# Patient Record
Sex: MALE | Race: BLACK OR AFRICAN AMERICAN | NOT HISPANIC OR LATINO | Employment: UNEMPLOYED | URBAN - METROPOLITAN AREA
[De-identification: names, ages, dates, MRNs, and addresses within clinical notes are randomized per-mention and may not be internally consistent; named-entity substitution may affect disease eponyms.]

---

## 2023-03-23 LAB
ALANINE AMINOTRANSFERASE (SGPT) (U/L) IN SER/PLAS: 123 U/L (ref 10–52)
ALBUMIN (G/DL) IN SER/PLAS: 4 G/DL (ref 3.4–5)
ALKALINE PHOSPHATASE (U/L) IN SER/PLAS: 374 U/L (ref 33–120)
ASPARTATE AMINOTRANSFERASE (SGOT) (U/L) IN SER/PLAS: 68 U/L (ref 9–39)
BILIRUBIN DIRECT (MG/DL) IN SER/PLAS: 0.2 MG/DL (ref 0–0.3)
BILIRUBIN TOTAL (MG/DL) IN SER/PLAS: 0.6 MG/DL (ref 0–1.2)
C REACTIVE PROTEIN (MG/L) IN SER/PLAS: 0.91 MG/DL
ERYTHROCYTE DISTRIBUTION WIDTH (RATIO) BY AUTOMATED COUNT: 14.3 % (ref 11.5–14.5)
ERYTHROCYTE MEAN CORPUSCULAR HEMOGLOBIN CONCENTRATION (G/DL) BY AUTOMATED: 32.7 G/DL (ref 32–36)
ERYTHROCYTE MEAN CORPUSCULAR VOLUME (FL) BY AUTOMATED COUNT: 94 FL (ref 80–100)
ERYTHROCYTES (10*6/UL) IN BLOOD BY AUTOMATED COUNT: 4.77 X10E12/L (ref 4.5–5.9)
HEMATOCRIT (%) IN BLOOD BY AUTOMATED COUNT: 45 % (ref 41–52)
HEMOGLOBIN (G/DL) IN BLOOD: 14.7 G/DL (ref 13.5–17.5)
LEUKOCYTES (10*3/UL) IN BLOOD BY AUTOMATED COUNT: 8.8 X10E9/L (ref 4.4–11.3)
NRBC (PER 100 WBCS) BY AUTOMATED COUNT: 0 /100 WBC (ref 0–0)
PLATELETS (10*3/UL) IN BLOOD AUTOMATED COUNT: 345 X10E9/L (ref 150–450)
PROTEIN TOTAL: 7.8 G/DL (ref 6.4–8.2)

## 2023-06-16 ENCOUNTER — HOSPITAL ENCOUNTER (OUTPATIENT)
Dept: DATA CONVERSION | Facility: HOSPITAL | Age: 53
End: 2023-06-16
Attending: INTERNAL MEDICINE
Payer: COMMERCIAL

## 2023-06-16 DIAGNOSIS — J45.909 UNSPECIFIED ASTHMA, UNCOMPLICATED (HHS-HCC): ICD-10-CM

## 2023-06-16 DIAGNOSIS — G47.30 SLEEP APNEA, UNSPECIFIED: ICD-10-CM

## 2023-06-16 DIAGNOSIS — Z79.69 LONG TERM (CURRENT) USE OF OTHER IMMUNOMODULATORS AND IMMUNOSUPPRESSANTS: ICD-10-CM

## 2023-06-16 DIAGNOSIS — K50.10 CROHN'S DISEASE OF LARGE INTESTINE WITHOUT COMPLICATIONS (MULTI): ICD-10-CM

## 2023-06-16 DIAGNOSIS — K50.118 CROHN'S DISEASE OF LARGE INTESTINE WITH OTHER COMPLICATION (MULTI): ICD-10-CM

## 2023-06-16 DIAGNOSIS — F17.290 NICOTINE DEPENDENCE, OTHER TOBACCO PRODUCT, UNCOMPLICATED: ICD-10-CM

## 2023-06-16 DIAGNOSIS — K50.113 CROHN'S DISEASE OF LARGE INTESTINE WITH FISTULA (MULTI): ICD-10-CM

## 2023-06-23 LAB
COMPLETE PATHOLOGY REPORT: NORMAL
CONVERTED CLINICAL DIAGNOSIS-HISTORY: NORMAL
CONVERTED FINAL DIAGNOSIS: NORMAL
CONVERTED FINAL REPORT PDF LINK TO COPY AND PASTE: NORMAL
CONVERTED GROSS DESCRIPTION: NORMAL

## 2023-09-21 PROBLEM — H25.12 AGE-RELATED NUCLEAR CATARACT OF LEFT EYE: Status: ACTIVE | Noted: 2023-09-21

## 2023-09-21 PROBLEM — F32.A DEPRESSION: Status: ACTIVE | Noted: 2023-09-21

## 2023-09-21 PROBLEM — K60.3 ANAL FISTULA: Status: ACTIVE | Noted: 2023-09-21

## 2023-09-21 PROBLEM — K61.1 ABSCESS, RECTUM: Status: ACTIVE | Noted: 2023-09-21

## 2023-09-21 PROBLEM — S34.22XA: Status: ACTIVE | Noted: 2023-09-21

## 2023-09-21 PROBLEM — H52.209 ASTIGMATISM: Status: ACTIVE | Noted: 2023-09-21

## 2023-09-21 PROBLEM — J44.9 COPD (CHRONIC OBSTRUCTIVE PULMONARY DISEASE) (MULTI): Status: ACTIVE | Noted: 2023-09-21

## 2023-09-21 PROBLEM — S22.32XD CLOSED FRACTURE OF ONE RIB OF LEFT SIDE WITH ROUTINE HEALING: Status: ACTIVE | Noted: 2023-03-13

## 2023-09-21 PROBLEM — H40.9 GLAUCOMA: Status: ACTIVE | Noted: 2023-09-21

## 2023-09-21 PROBLEM — Z78.9 DISCOMFORT: Status: ACTIVE | Noted: 2023-09-21

## 2023-09-21 PROBLEM — H25.11 AGE-RELATED NUCLEAR CATARACT OF RIGHT EYE: Status: ACTIVE | Noted: 2023-09-21

## 2023-09-21 PROBLEM — L73.9 FOLLICULITIS: Status: ACTIVE | Noted: 2023-09-21

## 2023-09-21 PROBLEM — K50.113: Status: ACTIVE | Noted: 2023-09-21

## 2023-09-21 PROBLEM — K52.9 ENTERITIS: Status: ACTIVE | Noted: 2023-09-21

## 2023-09-21 PROBLEM — L85.3 DRY SKIN: Status: ACTIVE | Noted: 2023-09-21

## 2023-09-21 PROBLEM — H40.003 GLAUCOMA SUSPECT OF BOTH EYES: Status: ACTIVE | Noted: 2023-09-21

## 2023-09-21 PROBLEM — K50.90 CROHN DISEASE (MULTI): Status: ACTIVE | Noted: 2023-09-21

## 2023-09-21 PROBLEM — H52.10 MYOPIA: Status: ACTIVE | Noted: 2023-09-21

## 2023-09-21 PROBLEM — G47.33 OSA (OBSTRUCTIVE SLEEP APNEA): Status: ACTIVE | Noted: 2023-09-21

## 2023-09-21 PROBLEM — R79.89 ABNORMAL LIVER FUNCTION TEST: Status: ACTIVE | Noted: 2023-09-21

## 2023-09-21 PROBLEM — J31.0 RHINITIS, CHRONIC: Status: ACTIVE | Noted: 2021-12-30

## 2023-09-21 PROBLEM — J93.9 PNEUMOTHORAX: Status: ACTIVE | Noted: 2023-03-12

## 2023-09-21 PROBLEM — M16.9 HIP OSTEOARTHRITIS: Status: ACTIVE | Noted: 2023-09-21

## 2023-09-21 PROBLEM — G45.9 TIA (TRANSIENT ISCHEMIC ATTACK): Status: ACTIVE | Noted: 2023-09-21

## 2023-09-21 PROBLEM — H18.519 FUCHS' CORNEAL DYSTROPHY: Status: ACTIVE | Noted: 2023-09-21

## 2023-09-21 PROBLEM — M87.00 AVN (AVASCULAR NECROSIS OF BONE) (MULTI): Status: ACTIVE | Noted: 2023-09-21

## 2023-09-21 PROBLEM — S43.102A SEPARATION OF LEFT ACROMIOCLAVICULAR JOINT: Status: ACTIVE | Noted: 2023-09-14

## 2023-09-21 PROBLEM — L40.9 PSORIASIS OF SCALP: Status: ACTIVE | Noted: 2023-09-21

## 2023-09-21 PROBLEM — K62.89 RECTAL PAIN: Status: ACTIVE | Noted: 2023-09-21

## 2023-09-21 PROBLEM — K61.1 PERIRECTAL ABSCESS: Status: ACTIVE | Noted: 2023-09-21

## 2023-09-21 PROBLEM — H20.10 CHRONIC ANTERIOR UVEITIS: Status: ACTIVE | Noted: 2023-09-21

## 2023-09-21 PROBLEM — S42.035D CLOSED NONDISPLACED FRACTURE OF ACROMIAL END OF LEFT CLAVICLE WITH ROUTINE HEALING: Status: ACTIVE | Noted: 2023-03-13

## 2023-09-21 PROBLEM — L40.0 PSORIASIS VULGARIS: Status: ACTIVE | Noted: 2019-11-19

## 2023-09-21 PROBLEM — E53.8 FOLIC ACID DEFICIENCY: Status: ACTIVE | Noted: 2023-09-21

## 2023-09-21 PROBLEM — R10.30 ABDOMINAL PAIN, LOWER: Status: ACTIVE | Noted: 2023-09-21

## 2023-09-21 PROBLEM — K60.30 ANAL FISTULA: Status: ACTIVE | Noted: 2023-09-21

## 2023-09-21 PROBLEM — H10.9 CONJUNCTIVITIS, LEFT EYE: Status: ACTIVE | Noted: 2023-09-21

## 2023-09-21 PROBLEM — R21 RASH AND OTHER NONSPECIFIC SKIN ERUPTION: Status: ACTIVE | Noted: 2019-11-19

## 2023-09-21 PROBLEM — R25.2: Status: ACTIVE | Noted: 2023-09-21

## 2023-09-21 PROBLEM — N52.9 ERECTILE DYSFUNCTION: Status: ACTIVE | Noted: 2023-09-21

## 2023-09-21 PROBLEM — T38.0X5A STEROID SIDE EFFECTS: Status: ACTIVE | Noted: 2023-09-21

## 2023-09-21 PROBLEM — R35.0 FREQUENT URINATION: Status: ACTIVE | Noted: 2023-09-21

## 2023-09-21 PROBLEM — F17.200 NICOTINE USE DISORDER: Status: ACTIVE | Noted: 2023-03-13

## 2023-09-21 PROBLEM — K04.7 TOOTH ABSCESS: Status: ACTIVE | Noted: 2023-09-21

## 2023-09-21 RX ORDER — ALBUTEROL SULFATE 90 UG/1
2 AEROSOL, METERED RESPIRATORY (INHALATION) EVERY 4 HOURS PRN
COMMUNITY
Start: 2023-01-26

## 2023-09-21 RX ORDER — CLOBETASOL PROPIONATE 0.05 G/100ML
SHAMPOO TOPICAL
COMMUNITY

## 2023-09-21 RX ORDER — FOLIC ACID 1 MG/1
1 TABLET ORAL DAILY
COMMUNITY
Start: 2019-03-06 | End: 2023-10-12 | Stop reason: WASHOUT

## 2023-09-21 RX ORDER — ALBUTEROL SULFATE 0.83 MG/ML
SOLUTION RESPIRATORY (INHALATION)
COMMUNITY

## 2023-09-21 RX ORDER — BETAMETHASONE DIPROPIONATE 0.5 MG/G
OINTMENT TOPICAL
COMMUNITY
Start: 2019-09-04 | End: 2023-10-12 | Stop reason: WASHOUT

## 2023-09-21 RX ORDER — OXYCODONE HYDROCHLORIDE 5 MG/1
5 TABLET ORAL EVERY 6 HOURS PRN
COMMUNITY
Start: 2023-03-14 | End: 2023-10-12 | Stop reason: WASHOUT

## 2023-09-21 RX ORDER — SODIUM PICOSULFATE, MAGNESIUM OXIDE, AND ANHYDROUS CITRIC ACID 10; 3.5; 12 MG/160ML; G/160ML; G/160ML
LIQUID ORAL
COMMUNITY
Start: 2023-03-03 | End: 2023-10-12 | Stop reason: WASHOUT

## 2023-09-21 RX ORDER — DOCUSATE SODIUM 100 MG/1
100 CAPSULE, LIQUID FILLED ORAL 2 TIMES DAILY
COMMUNITY
Start: 2023-03-13 | End: 2023-10-12 | Stop reason: WASHOUT

## 2023-09-21 RX ORDER — TADALAFIL 5 MG/1
TABLET ORAL
COMMUNITY
Start: 2021-03-11 | End: 2024-04-22 | Stop reason: WASHOUT

## 2023-09-21 RX ORDER — METHYLPREDNISOLONE 4 MG/1
TABLET ORAL
COMMUNITY
Start: 2023-01-25 | End: 2023-10-12 | Stop reason: WASHOUT

## 2023-09-21 RX ORDER — SILDENAFIL 25 MG/1
TABLET, FILM COATED ORAL
COMMUNITY
Start: 2022-11-30

## 2023-09-21 RX ORDER — AMLODIPINE BESYLATE 5 MG/1
5 TABLET ORAL DAILY
COMMUNITY
Start: 2022-10-10 | End: 2023-10-12 | Stop reason: WASHOUT

## 2023-09-21 RX ORDER — MONTELUKAST SODIUM 10 MG/1
10 TABLET ORAL NIGHTLY
COMMUNITY
Start: 2023-04-29 | End: 2023-10-12 | Stop reason: WASHOUT

## 2023-09-21 RX ORDER — GABAPENTIN 300 MG/1
300 CAPSULE ORAL 3 TIMES DAILY
COMMUNITY
Start: 2021-08-17 | End: 2024-04-04 | Stop reason: ALTCHOICE

## 2023-09-21 RX ORDER — ACETAMINOPHEN 500 MG/1
CAPSULE, LIQUID FILLED ORAL 4 TIMES DAILY PRN
COMMUNITY
End: 2023-10-12 | Stop reason: WASHOUT

## 2023-09-21 RX ORDER — DORZOLAMIDE HYDROCHLORIDE AND TIMOLOL MALEATE 20; 5 MG/ML; MG/ML
SOLUTION/ DROPS OPHTHALMIC
COMMUNITY

## 2023-09-21 RX ORDER — FLUOCINONIDE 0.5 MG/G
CREAM TOPICAL
COMMUNITY
Start: 2019-08-30

## 2023-09-21 RX ORDER — FLUTICASONE PROPIONATE 50 MCG
SPRAY, SUSPENSION (ML) NASAL
COMMUNITY
Start: 2013-10-26

## 2023-09-21 RX ORDER — METHOCARBAMOL 500 MG/1
500 TABLET, FILM COATED ORAL 3 TIMES DAILY
COMMUNITY
Start: 2023-04-25

## 2023-09-21 RX ORDER — TIOTROPIUM BROMIDE INHALATION SPRAY 3.12 UG/1
SPRAY, METERED RESPIRATORY (INHALATION)
COMMUNITY
End: 2023-10-12 | Stop reason: WASHOUT

## 2023-09-21 RX ORDER — MOMETASONE FUROATE AND FORMOTEROL FUMARATE DIHYDRATE 50; 5 UG/1; UG/1
AEROSOL RESPIRATORY (INHALATION)
COMMUNITY
Start: 2023-05-30

## 2023-09-21 RX ORDER — ASPIRIN 325 MG
TABLET, DELAYED RELEASE (ENTERIC COATED) ORAL
COMMUNITY
End: 2023-10-12 | Stop reason: WASHOUT

## 2023-09-21 RX ORDER — USTEKINUMAB 90 MG/ML
INJECTION, SOLUTION SUBCUTANEOUS
COMMUNITY
Start: 2019-09-06 | End: 2023-10-18 | Stop reason: SDUPTHER

## 2023-09-21 RX ORDER — LORATADINE 10 MG/1
10 TABLET ORAL DAILY PRN
COMMUNITY
Start: 2023-04-29

## 2023-09-21 RX ORDER — LIDOCAINE 50 MG/G
OINTMENT TOPICAL
COMMUNITY
Start: 2021-01-28

## 2023-10-06 ENCOUNTER — LAB (OUTPATIENT)
Dept: LAB | Facility: LAB | Age: 53
End: 2023-10-06
Payer: COMMERCIAL

## 2023-10-06 DIAGNOSIS — R74.8 ABNORMAL LEVELS OF OTHER SERUM ENZYMES: ICD-10-CM

## 2023-10-06 DIAGNOSIS — K50.90 CROHN'S DISEASE, UNSPECIFIED, WITHOUT COMPLICATIONS (MULTI): Primary | ICD-10-CM

## 2023-10-06 DIAGNOSIS — K50.90 CROHN'S DISEASE, UNSPECIFIED, WITHOUT COMPLICATIONS (MULTI): ICD-10-CM

## 2023-10-06 LAB
A1AT SERPL NEPH-MCNC: 178 MG/DL (ref 84–218)
ALP SERPL-CCNC: 305 U/L (ref 33–120)
CERULOPLASMIN SERPL-MCNC: 39.8 MG/DL (ref 20–60)
IGA SERPL-MCNC: 477 MG/DL (ref 70–400)
IGG SERPL-MCNC: 1820 MG/DL (ref 700–1600)
IGM SERPL-MCNC: 134 MG/DL (ref 40–230)

## 2023-10-06 PROCEDURE — 82103 ALPHA-1-ANTITRYPSIN TOTAL: CPT

## 2023-10-06 PROCEDURE — 36415 COLL VENOUS BLD VENIPUNCTURE: CPT

## 2023-10-06 PROCEDURE — 86376 MICROSOMAL ANTIBODY EACH: CPT

## 2023-10-06 PROCEDURE — 82784 ASSAY IGA/IGD/IGG/IGM EACH: CPT

## 2023-10-06 PROCEDURE — 86381 MITOCHONDRIAL ANTIBODY EACH: CPT

## 2023-10-06 PROCEDURE — 84075 ASSAY ALKALINE PHOSPHATASE: CPT

## 2023-10-06 PROCEDURE — 83516 IMMUNOASSAY NONANTIBODY: CPT

## 2023-10-06 PROCEDURE — 82390 ASSAY OF CERULOPLASMIN: CPT

## 2023-10-08 LAB — LKM AB TITR SER IF: NORMAL {TITER}

## 2023-10-09 LAB — MITOCHONDRIA AB SER QL IF: NEGATIVE

## 2023-10-10 LAB
MYELOPEROXIDASE AB SER-ACNC: 0 AU/ML (ref 0–19)
PROTEINASE3 AB SER-ACNC: 1 AU/ML (ref 0–19)
SOLUBLE LIVER IGG SER IA-ACNC: 2 U (ref 0–24.9)

## 2023-10-12 ENCOUNTER — OFFICE VISIT (OUTPATIENT)
Dept: GASTROENTEROLOGY | Facility: CLINIC | Age: 53
End: 2023-10-12
Payer: COMMERCIAL

## 2023-10-12 VITALS
BODY MASS INDEX: 28.71 KG/M2 | WEIGHT: 168.2 LBS | HEART RATE: 50 BPM | HEIGHT: 64 IN | TEMPERATURE: 98.4 F | DIASTOLIC BLOOD PRESSURE: 75 MMHG | SYSTOLIC BLOOD PRESSURE: 150 MMHG

## 2023-10-12 DIAGNOSIS — K50.113 CROHN'S DISEASE OF LARGE INTESTINE WITH FISTULA (MULTI): Primary | ICD-10-CM

## 2023-10-12 PROCEDURE — 99213 OFFICE O/P EST LOW 20 MIN: CPT | Performed by: INTERNAL MEDICINE

## 2023-10-12 ASSESSMENT — PAIN SCALES - GENERAL: PAINLEVEL: 0-NO PAIN

## 2023-10-12 NOTE — PATIENT INSTRUCTIONS
Thank you for coming in for follow-up today.  As we discussed, your Crohn's disease is doing quite well on therapy.  As reviewed:    1) continue Stelara every 4 weeks  2) follow-up in the office in 6 months  3) follow-up with Dr. Hendrickson in hepatology for ongoing evaluation of suspected liver abnormalities  4) call the office with any worsening diarrhea, fistula problems, or other signs of increasing Crohn's disease activity.

## 2023-10-12 NOTE — PROGRESS NOTES
"F/U for 53-year-old man with long-standing Crohn's colitis and perianal Crohn's disease. Initially diagnosed as UC in 1990's.  Later developed perianal disease -->Crohn's disease.  Treated with 5-ASA and steroids on and off for years.    Started Humira in 3/2019; did OK but developed a treatment-related palmar-plantar psoriasis. 9/2019 changed to Stelara.    (+) HS with drainage intermittently in underarms    3/2020 FCP 34 (normal) and Stelara level 2.7 on every 8 week dosing; changed Q6 week dosing 6/2020.    12/2020 f/u Anser UST  on Stelara every 6 weeks = 4.8 with no antibodies.   FCP 52  CRP 1.42    6/2021 Stellara frequency from Q6 to Q4 for worsening/persistent perianal disease; since dose increased, has overall been doing well.    (+) mild persistent LFT abnormalities; USG of liver with Doppler unrevealing.  ?correlate with change to every 4 week Stelara? Saw Dr. Hendrickson.  MRI w/ elastography suggests cirrhosis w/o portal HTN.    6/2023 colonoscopy OK; path (-)    In follow-up doing OK on every 4 week Stelara.  Feels overall well.  Moving bowels 3-4 daily.  No setons and no fistula problems.  In September he had one episode of BRBPR with a small clot.  Around that time, he was one week late for Stelara due to insurance issues.  Stools were softer and runnier at this time.      No oral ulcers.  No joint inflammation, rash or eye inflammation.    Complete review of systems otherwise negative per complaint     Meds reviewed    Vital signs reviewed  /75   Pulse 50   Temp 36.9 °C (98.4 °F)   Ht 1.626 m (5' 4\")   Wt 76.3 kg (168 lb 3.2 oz)   BMI 28.87 kg/m²   Patient alert and oriented in no acute distress  Anicteric  No cervical adenopathy  Cardiac exam regular rate and rhythm S1-S2 without murmurs gallops or rubs  Lungs clear to auscultation bilaterally  Abdomen soft and nontender without organomegaly or mass.  No rebound or guarding.  Bowel sounds present  Extremities without edema; wound left lower " leg 2.5 cm, healing (he fell)  Neurologically grossly intact    A/P Crohn's colitis with perianal fistula lysing disease.  Overall, he is doing quite well on every 4-week Stelara.  We will continue therapy without change.  His last colonoscopy showed no active disease in summer 2023.  Recent labs have been stable.    He does appear to have significant liver disease and will be following with Dr. Hendrickson for this.  Liver biopsy is pending per patient.    We will continue current therapy and follow-up in the office in 6 months.  Will call with any change in symptoms.

## 2023-10-17 ENCOUNTER — SPECIALTY PHARMACY (OUTPATIENT)
Dept: PHARMACY | Facility: CLINIC | Age: 53
End: 2023-10-17

## 2023-10-17 DIAGNOSIS — K50.113 CROHN'S DISEASE OF LARGE INTESTINE WITH FISTULA (MULTI): Primary | ICD-10-CM

## 2023-10-17 RX ORDER — USTEKINUMAB 90 MG/ML
INJECTION, SOLUTION SUBCUTANEOUS
Qty: 1 ML | Refills: 5 | Status: SHIPPED | OUTPATIENT
Start: 2023-10-17 | End: 2024-03-26 | Stop reason: SDUPTHER

## 2023-10-18 ENCOUNTER — PHARMACY VISIT (OUTPATIENT)
Dept: PHARMACY | Facility: CLINIC | Age: 53
End: 2023-10-18
Payer: MEDICAID

## 2023-10-18 ENCOUNTER — TELEPHONE (OUTPATIENT)
Dept: GASTROENTEROLOGY | Facility: HOSPITAL | Age: 53
End: 2023-10-18
Payer: COMMERCIAL

## 2023-10-18 DIAGNOSIS — K50.113 CROHN'S DISEASE OF LARGE INTESTINE WITH FISTULA (MULTI): Primary | ICD-10-CM

## 2023-10-18 PROCEDURE — RXMED WILLOW AMBULATORY MEDICATION CHARGE

## 2023-10-19 RX ORDER — USTEKINUMAB 90 MG/ML
90 INJECTION, SOLUTION SUBCUTANEOUS ONCE
Qty: 1 ML | Refills: 11 | Status: SHIPPED | OUTPATIENT
Start: 2023-10-19 | End: 2024-05-22 | Stop reason: WASHOUT

## 2023-10-20 ENCOUNTER — SPECIALTY PHARMACY (OUTPATIENT)
Dept: PHARMACY | Facility: CLINIC | Age: 53
End: 2023-10-20

## 2023-10-23 ENCOUNTER — SPECIALTY PHARMACY (OUTPATIENT)
Dept: PHARMACY | Facility: CLINIC | Age: 53
End: 2023-10-23

## 2023-11-14 ENCOUNTER — PHARMACY VISIT (OUTPATIENT)
Dept: PHARMACY | Facility: CLINIC | Age: 53
End: 2023-11-14
Payer: MEDICAID

## 2023-11-14 PROCEDURE — RXMED WILLOW AMBULATORY MEDICATION CHARGE

## 2023-11-17 ENCOUNTER — SPECIALTY PHARMACY (OUTPATIENT)
Dept: PHARMACY | Facility: CLINIC | Age: 53
End: 2023-11-17

## 2023-12-11 PROCEDURE — RXMED WILLOW AMBULATORY MEDICATION CHARGE

## 2023-12-14 ENCOUNTER — PHARMACY VISIT (OUTPATIENT)
Dept: PHARMACY | Facility: CLINIC | Age: 53
End: 2023-12-14
Payer: MEDICAID

## 2024-01-09 ENCOUNTER — PHARMACY VISIT (OUTPATIENT)
Dept: PHARMACY | Facility: CLINIC | Age: 54
End: 2024-01-09
Payer: MEDICAID

## 2024-01-09 PROCEDURE — RXMED WILLOW AMBULATORY MEDICATION CHARGE

## 2024-01-10 ENCOUNTER — SPECIALTY PHARMACY (OUTPATIENT)
Dept: PHARMACY | Facility: CLINIC | Age: 54
End: 2024-01-10

## 2024-02-02 ENCOUNTER — PHARMACY VISIT (OUTPATIENT)
Dept: PHARMACY | Facility: CLINIC | Age: 54
End: 2024-02-02
Payer: MEDICAID

## 2024-02-02 PROCEDURE — RXMED WILLOW AMBULATORY MEDICATION CHARGE

## 2024-02-15 ENCOUNTER — OFFICE VISIT (OUTPATIENT)
Dept: GASTROENTEROLOGY | Facility: CLINIC | Age: 54
End: 2024-02-15
Payer: COMMERCIAL

## 2024-02-15 VITALS
SYSTOLIC BLOOD PRESSURE: 134 MMHG | WEIGHT: 171.2 LBS | TEMPERATURE: 98.6 F | BODY MASS INDEX: 29.23 KG/M2 | HEIGHT: 64 IN | HEART RATE: 61 BPM | DIASTOLIC BLOOD PRESSURE: 73 MMHG

## 2024-02-15 DIAGNOSIS — K50.113 CROHN'S DISEASE OF LARGE INTESTINE WITH FISTULA (MULTI): ICD-10-CM

## 2024-02-15 DIAGNOSIS — K83.1 CHOLESTATIC LIVER DISEASE (CMS-HCC): ICD-10-CM

## 2024-02-15 DIAGNOSIS — K74.00 LIVER FIBROSIS: ICD-10-CM

## 2024-02-15 DIAGNOSIS — R74.8 ELEVATED ALKALINE PHOSPHATASE LEVEL: Primary | ICD-10-CM

## 2024-02-15 PROCEDURE — 99214 OFFICE O/P EST MOD 30 MIN: CPT | Performed by: INTERNAL MEDICINE

## 2024-02-15 ASSESSMENT — PAIN SCALES - GENERAL: PAINLEVEL: 8

## 2024-02-15 NOTE — PATIENT INSTRUCTIONS
Welcome to Dr. [facility].  Dr. Hendrickson sees patients at the following sites:  [place] [address] [facility] at [facility] [address], [address] at [facility] at [facility], [facility] [address]    Dr. Hendrickson's hepatology care coordinator, Viridiana REECE, can be reached at [phone].  Dr. Hendrickson's , Jane Amaro, can be reached at [phone].     Get blood work a week prior to your liver biopsy.    Get a Liver Biopsy at [facility]. Call [phone] to schedule.    See me back in clinic a month after your biopsy.   Schedule your appointment on your way out today.

## 2024-02-15 NOTE — H&P (VIEW-ONLY)
Subjective     Follow up visit - elevated alk phos, chronic cholestatic liver disease.    History of Present Illness:   Arturo Craft is a 54 y.o. male who presents to GI clinic for follow up of abnormal liver enzymes.    Here to discuss prior testing and the role of pursuing a liver biopsy    Has a long-standing Crohn's colitis and perianal Crohn's disease.   He was actually, Initially diagnosed as having ulcerative colitis. However, he later developed perianal disease and his diagnosis was changed to Crohn's disease.      He was treated with 5-ASA and steroids on and off for years. He started on Humira in 3/2019; tolerated Humira well. However he developed a treatment-related palmar-plantar psoriasis and Humira was discontinued. Sept. 2019 he was started on Stelara.     Timeline of some IBD related complications and medication changes:  -11/2019 had new anterior perianal abscess; had EUA and abscess drained, but no new fistula.   -(+) HS with drainage intermittently in underarms  - March to June 2020 Stelera q8 week dosing changed to q6 week dosing..  - perianal pain and new fistula site.   - 12/2020 continued drainage   - 1/2021 colonoscopy showed quiescent pancolonic disease with small pseudopolyps.   - 6/2021 Stellara frequency was increased to Q6 to Q4 for worsening/persistent perianal disease ; since dose increased, has overall been doing well.     Alk phos first elevated in Dec 2020: 191 U/L  Persistently elevated in 2021: Alk phos 256 (8/21) --> 214 ( Sept 2021).  Peak elevated 374 U/L  (March 2023). AST and ALT also elevated.   USG of liver with Doppler study unremarkable.      Concern that the ALK elevation may temporally correlate with change to every 4 week Stelara (which occurred in mid-2021).     Differential diagnosis also includes PSC, given his longstanding history of IBD.    Labwork/serologies : AMA neg, LK microsomal neg, Soluble liver ag: neg, ANCA neg  IGG elevated at 1820.    MRI MRCP with  elastography completed on 8/22/23.    Here for further evaluation of this issue.   Review of Systems  Review of Systems    Daily itch - total body itching  At times severe  Maybe 3 days per week.  Takes a medium temp shower.  Uses a soap.  Does not wake up at night.  Not worse at nigh time.    Past Medical History   has a past medical history of Chronic iridocyclitis, unspecified eye (10/30/2013), Crohn's disease of large intestine with fistula (CMS/HCC) (07/07/2022), Enterocolitis due to Clostridium difficile, not specified as recurrent (03/05/2019), Other conditions influencing health status (10/30/2013), and Personal history of other diseases of the respiratory system.     Social History   reports that he has been smoking cigarettes and cigars. He has never used smokeless tobacco. He reports current drug use. Drug: Marijuana. He reports that he does not drink alcohol.     Family History  family history is not on file.     Allergies  Allergies   Allergen Reactions    Budesonide Anaphylaxis     fever    Adalimumab Unknown    Aspirin Unknown    Entercote Unknown    Iodinated Contrast Media GI Upset    Milk Unknown    Salicylates GI Upset    Sulfamethoxazole Unknown       Medications  Current Outpatient Medications   Medication Instructions    albuterol 2.5 mg /3 mL (0.083 %) nebulizer solution USE 3 ML VIA NEBULIZER EVERY 4 HOURS AS NEEDED FOR WHEEZING/SHORTNESS OF BREATH.    clobetasoL 0.05 % shampoo PLEASE SEE ATTACHED FOR DETAILED DIRECTIONS    dorzolamide-timoloL (Cosopt) 22.3-6.8 mg/mL ophthalmic solution USE 1 DROP IN BOTH EYES EVERY 12 HOURS.    Dulera 50-5 mcg/actuation HFA aerosol inhaler inhaler INHALE 2 PUFFS AS INSTRUCTED TWICE DAILY.    fluocinonide 0.05 % cream 1 Application    fluticasone (Flonase) 50 mcg/actuation nasal spray nasal, Daily RT    gabapentin (NEURONTIN) 300 mg, oral, 3 times daily    lidocaine (Xylocaine) 5 % ointment APPLY TO AFFECTED AREAS AS DIRECTED.    loratadine (CLARITIN) 10 mg,  "oral, Daily PRN    methocarbamol (ROBAXIN) 500 mg, oral, 3 times daily    sildenafil (Viagra) 25 mg tablet TAKE 4 TABLETS BY MOUTH AS NEEDED 30-60 MINUTES BEFORE SEXUAL INTERCOURSE    Stelara 90 mg, subcutaneous, Once    tadalafil (Cialis) 5 mg tablet oral    ustekinumab (Stelara) injection INJECT 90MG (1 SYRINGE) UNDER THE SKIN ONCE EVERY 4 WEEKS.    Ventolin HFA 90 mcg/actuation inhaler 2 puffs, inhalation, Every 4 hours PRN        Objective   Visit Vitals  /73 Comment: high bp   Pulse 61   Temp 37 °C (98.6 °F) (Temporal)          10/8/2021    10:17 AM 10/27/2021    10:40 AM 12/9/2021    11:22 AM 7/7/2022     8:46 AM 6/22/2023     2:03 PM 10/12/2023     9:21 AM 2/15/2024     9:26 AM   Vitals   Systolic 145 122 133 164 136 150 134   Diastolic 88 72 81 94 80 75 73   Heart Rate 59 60 65 61 59 50 61   Temp 36.3 °C (97.3 °F)  36.8 °C (98.2 °F) 36.3 °C (97.4 °F) 2.2 °C (36 °F) 36.9 °C (98.4 °F) 37 °C (98.6 °F)   Resp  12   18     Height (in)  1.651 m (5' 5\") 1.651 m (5' 5\")  1.626 m (5' 4\") 1.626 m (5' 4\") 1.626 m (5' 4\")   Weight (lb) 193.56 196 198 198 67 168.2 171.2   BMI 32.21 kg/m2 32.62 kg/m2 32.95 kg/m2 32.95 kg/m2 11.5 kg/m2 28.87 kg/m2 29.39 kg/m2   BSA (m2) 2.01 m2 2.02 m2 2.03 m2 2.03 m2 1.17 m2 1.86 m2 1.87 m2   Visit Report      Report Report     Physical Exam  Vitals reviewed.   Constitutional:       General: He is awake.      Appearance: Normal appearance.   HENT:      Head: Normocephalic and atraumatic.      Nose: Nose normal.      Mouth/Throat:      Mouth: Mucous membranes are moist.   Eyes:      Pupils: Pupils are equal, round, and reactive to light.   Cardiovascular:      Rate and Rhythm: Normal rate.   Pulmonary:      Effort: Pulmonary effort is normal.   Abdominal:      Comments: Liver edge hard   Skin:     Comments: Rash on upper arms  petechial   Neurological:      Mental Status: He is alert and oriented to person, place, and time. Mental status is at baseline.   Psychiatric:         " Attention and Perception: Attention and perception normal.         Mood and Affect: Mood normal.         Behavior: Behavior normal.         Labs    WBC   Date/Time Value Ref Range Status   03/23/2023 09:15 AM 8.8 4.4 - 11.3 x10E9/L Final     Hemoglobin   Date/Time Value Ref Range Status   03/23/2023 09:15 AM 14.7 13.5 - 17.5 g/dL Final     Hematocrit   Date/Time Value Ref Range Status   03/23/2023 09:15 AM 45.0 41.0 - 52.0 % Final     MCV   Date/Time Value Ref Range Status   03/23/2023 09:15 AM 94 80 - 100 fL Final     Platelets   Date/Time Value Ref Range Status   03/23/2023 09:15  150 - 450 x10E9/L Final        Total Protein   Date/Time Value Ref Range Status   03/23/2023 09:15 AM 7.8 6.4 - 8.2 g/dL Final     Albumin   Date/Time Value Ref Range Status   03/23/2023 09:15 AM 4.0 3.4 - 5.0 g/dL Final     AST   Date/Time Value Ref Range Status   03/23/2023 09:15 AM 68 (H) 9 - 39 U/L Final     ALT (SGPT)   Date/Time Value Ref Range Status   03/23/2023 09:15  (H) 10 - 52 U/L Final     Comment:      Patients treated with Sulfasalazine may generate    falsely decreased results for ALT.       Alkaline Phosphatase   Date/Time Value Ref Range Status   10/06/2023 09:07  (H) 33 - 120 U/L Final     Total Bilirubin   Date/Time Value Ref Range Status   03/23/2023 09:15 AM 0.6 0.0 - 1.2 mg/dL Final     Bilirubin, Direct   Date/Time Value Ref Range Status   03/23/2023 09:15 AM 0.2 0.0 - 0.3 mg/dL Final        Vitamin D, 25-Hydroxy   Date/Time Value Ref Range Status   08/31/2021 02:57 PM 49 ng/mL Final     Comment:     .  DEFICIENCY:         < 20   NG/ML  INSUFFICIENCY:      20-29  NG/ML  SUFFICIENCY:         NG/ML    THIS ASSAY ACCURATELY QUANTIFIES THE SUM OF  VITAMIN D3, 25-HYDROXY AND VIT D2,25-HYDROXY.          AST   Date/Time Value Ref Range Status   03/23/2023 09:15 AM 68 (H) 9 - 39 U/L Final     ALT (SGPT)   Date/Time Value Ref Range Status   03/23/2023 09:15  (H) 10 - 52 U/L Final     Comment:  "     Patients treated with Sulfasalazine may generate    falsely decreased results for ALT.       Alkaline Phosphatase   Date/Time Value Ref Range Status   10/06/2023 09:07  (H) 33 - 120 U/L Final     Total Bilirubin   Date/Time Value Ref Range Status   03/23/2023 09:15 AM 0.6 0.0 - 1.2 mg/dL Final     Bilirubin, Direct   Date/Time Value Ref Range Status   03/23/2023 09:15 AM 0.2 0.0 - 0.3 mg/dL Final     Albumin   Date/Time Value Ref Range Status   03/23/2023 09:15 AM 4.0 3.4 - 5.0 g/dL Final     Total Protein   Date/Time Value Ref Range Status   03/23/2023 09:15 AM 7.8 6.4 - 8.2 g/dL Final        No results found for: \"PROTIME\", \"INR\", \"PTT\"    MRI     IMPRESSION:  1. Cirrhotic morphology of the liver without evidence of portal  hypertension. MR elastography demonstrates stage 3-4 fibrosis.  2. Hepatic segment VII hemangioma. No suspicious liver lesions.  3. 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    Assessment/Plan   Arturo Craft is a 54 y.o. male who presents to GI/Liver clinic for for follow up for suspected chronic cholestatic liver disease/persistently abnormal ALK.    Impression:  Main differential diagnosis is for PSC given longstanding Crohn's colitis vs. drug induced liver injury from Stelera (especially as dosing interval has shortened; less likely AIH, PBC, sarcoidosis.     work-up thus far with additional serologies, and MRI/MRCP/elastography. MR Elastography suggests cirrhosis.     Plan:  Diagnosis remains elusive. Discussed obtaining a liver biopsy.  Discussed the liver biopsy procedure including R/B/A. He will follow up post biopsy.     SHAI Hendrickson MD    Instructions      Franky Hendrickson MD           "

## 2024-02-19 ENCOUNTER — LAB (OUTPATIENT)
Dept: LAB | Facility: LAB | Age: 54
End: 2024-02-19
Payer: COMMERCIAL

## 2024-02-19 DIAGNOSIS — K74.00 LIVER FIBROSIS: ICD-10-CM

## 2024-02-19 DIAGNOSIS — K50.113 CROHN'S DISEASE OF LARGE INTESTINE WITH FISTULA (MULTI): ICD-10-CM

## 2024-02-19 DIAGNOSIS — R74.8 ELEVATED ALKALINE PHOSPHATASE LEVEL: ICD-10-CM

## 2024-02-19 LAB
ALBUMIN SERPL BCP-MCNC: 4 G/DL (ref 3.4–5)
ALP SERPL-CCNC: 313 U/L (ref 33–120)
ALT SERPL W P-5'-P-CCNC: 75 U/L (ref 10–52)
AST SERPL W P-5'-P-CCNC: 68 U/L (ref 9–39)
BASOPHILS # BLD AUTO: 0.05 X10*3/UL (ref 0–0.1)
BASOPHILS NFR BLD AUTO: 0.6 %
BILIRUB DIRECT SERPL-MCNC: 0.3 MG/DL (ref 0–0.3)
BILIRUB SERPL-MCNC: 1.1 MG/DL (ref 0–1.2)
EOSINOPHIL # BLD AUTO: 0.12 X10*3/UL (ref 0–0.7)
EOSINOPHIL NFR BLD AUTO: 1.4 %
ERYTHROCYTE [DISTWIDTH] IN BLOOD BY AUTOMATED COUNT: 14.1 % (ref 11.5–14.5)
HCT VFR BLD AUTO: 45.5 % (ref 41–52)
HGB BLD-MCNC: 15 G/DL (ref 13.5–17.5)
IGG SERPL-MCNC: 1710 MG/DL (ref 700–1600)
IGG1 SER-MCNC: 1090 MG/DL (ref 490–1140)
IGG2 SER-MCNC: 671 MG/DL (ref 150–640)
IGG3 SER-MCNC: 32 MG/DL (ref 11–85)
IGG4 SER-MCNC: 211 MG/DL (ref 3–200)
IMM GRANULOCYTES # BLD AUTO: 0.03 X10*3/UL (ref 0–0.7)
IMM GRANULOCYTES NFR BLD AUTO: 0.4 % (ref 0–0.9)
INR PPP: 1.1 (ref 0.9–1.1)
LYMPHOCYTES # BLD AUTO: 2.65 X10*3/UL (ref 1.2–4.8)
LYMPHOCYTES NFR BLD AUTO: 31.4 %
MCH RBC QN AUTO: 30.5 PG (ref 26–34)
MCHC RBC AUTO-ENTMCNC: 33 G/DL (ref 32–36)
MCV RBC AUTO: 93 FL (ref 80–100)
MONOCYTES # BLD AUTO: 0.71 X10*3/UL (ref 0.1–1)
MONOCYTES NFR BLD AUTO: 8.4 %
NEUTROPHILS # BLD AUTO: 4.87 X10*3/UL (ref 1.2–7.7)
NEUTROPHILS NFR BLD AUTO: 57.8 %
NRBC BLD-RTO: 0 /100 WBCS (ref 0–0)
PLATELET # BLD AUTO: 232 X10*3/UL (ref 150–450)
PROT SERPL-MCNC: 7.9 G/DL (ref 6.4–8.2)
PROTHROMBIN TIME: 12.1 SECONDS (ref 9.8–12.8)
RBC # BLD AUTO: 4.92 X10*6/UL (ref 4.5–5.9)
WBC # BLD AUTO: 8.4 X10*3/UL (ref 4.4–11.3)

## 2024-02-19 PROCEDURE — 85025 COMPLETE CBC W/AUTO DIFF WBC: CPT

## 2024-02-19 PROCEDURE — 36415 COLL VENOUS BLD VENIPUNCTURE: CPT

## 2024-02-19 PROCEDURE — 85610 PROTHROMBIN TIME: CPT

## 2024-02-19 PROCEDURE — 82784 ASSAY IGA/IGD/IGG/IGM EACH: CPT

## 2024-02-19 PROCEDURE — 80076 HEPATIC FUNCTION PANEL: CPT

## 2024-02-27 ENCOUNTER — HOSPITAL ENCOUNTER (OUTPATIENT)
Dept: RADIOLOGY | Facility: HOSPITAL | Age: 54
Discharge: HOME | End: 2024-02-27
Payer: COMMERCIAL

## 2024-02-27 VITALS
OXYGEN SATURATION: 95 % | RESPIRATION RATE: 16 BRPM | WEIGHT: 171 LBS | HEIGHT: 64 IN | SYSTOLIC BLOOD PRESSURE: 141 MMHG | DIASTOLIC BLOOD PRESSURE: 94 MMHG | BODY MASS INDEX: 29.19 KG/M2 | HEART RATE: 58 BPM | TEMPERATURE: 98 F

## 2024-02-27 DIAGNOSIS — K50.113 CROHN'S DISEASE OF LARGE INTESTINE WITH FISTULA (MULTI): ICD-10-CM

## 2024-02-27 DIAGNOSIS — K74.00 LIVER FIBROSIS: ICD-10-CM

## 2024-02-27 DIAGNOSIS — R74.8 ELEVATED ALKALINE PHOSPHATASE LEVEL: ICD-10-CM

## 2024-02-27 PROCEDURE — 2500000004 HC RX 250 GENERAL PHARMACY W/ HCPCS (ALT 636 FOR OP/ED): Performed by: STUDENT IN AN ORGANIZED HEALTH CARE EDUCATION/TRAINING PROGRAM

## 2024-02-27 PROCEDURE — 99152 MOD SED SAME PHYS/QHP 5/>YRS: CPT

## 2024-02-27 PROCEDURE — 88307 TISSUE EXAM BY PATHOLOGIST: CPT | Mod: TC,AHULAB | Performed by: INTERNAL MEDICINE

## 2024-02-27 PROCEDURE — 47000 NEEDLE BIOPSY OF LIVER PERQ: CPT | Performed by: STUDENT IN AN ORGANIZED HEALTH CARE EDUCATION/TRAINING PROGRAM

## 2024-02-27 PROCEDURE — 99152 MOD SED SAME PHYS/QHP 5/>YRS: CPT | Performed by: STUDENT IN AN ORGANIZED HEALTH CARE EDUCATION/TRAINING PROGRAM

## 2024-02-27 PROCEDURE — 76942 ECHO GUIDE FOR BIOPSY: CPT | Performed by: STUDENT IN AN ORGANIZED HEALTH CARE EDUCATION/TRAINING PROGRAM

## 2024-02-27 PROCEDURE — 88307 TISSUE EXAM BY PATHOLOGIST: CPT | Performed by: PATHOLOGY

## 2024-02-27 PROCEDURE — 2500000005 HC RX 250 GENERAL PHARMACY W/O HCPCS: Performed by: STUDENT IN AN ORGANIZED HEALTH CARE EDUCATION/TRAINING PROGRAM

## 2024-02-27 PROCEDURE — 2720000007 HC OR 272 NO HCPCS

## 2024-02-27 PROCEDURE — 88313 SPECIAL STAINS GROUP 2: CPT | Performed by: PATHOLOGY

## 2024-02-27 PROCEDURE — 76942 ECHO GUIDE FOR BIOPSY: CPT

## 2024-02-27 RX ORDER — FENTANYL CITRATE 50 UG/ML
INJECTION, SOLUTION INTRAMUSCULAR; INTRAVENOUS
Status: COMPLETED | OUTPATIENT
Start: 2024-02-27 | End: 2024-02-27

## 2024-02-27 RX ORDER — MIDAZOLAM HYDROCHLORIDE 1 MG/ML
INJECTION INTRAMUSCULAR; INTRAVENOUS
Status: COMPLETED | OUTPATIENT
Start: 2024-02-27 | End: 2024-02-27

## 2024-02-27 RX ADMIN — Medication 10 ML: at 09:42

## 2024-02-27 RX ADMIN — MIDAZOLAM HYDROCHLORIDE 1 MG: 1 INJECTION INTRAMUSCULAR; INTRAVENOUS at 09:35

## 2024-02-27 RX ADMIN — FENTANYL CITRATE 50 MCG: 50 INJECTION, SOLUTION INTRAMUSCULAR; INTRAVENOUS at 09:35

## 2024-02-27 ASSESSMENT — PAIN SCALES - GENERAL

## 2024-02-27 ASSESSMENT — PAIN - FUNCTIONAL ASSESSMENT
PAIN_FUNCTIONAL_ASSESSMENT: 0-10

## 2024-02-27 NOTE — DISCHARGE INSTRUCTIONS
[id number] Liver Biopsy    A liver biopsy is a test used to help doctors diagnose liver disease. It help doctors decide our  medical treatment. A small piece of tissue is taken from the liver using a special kind of needle.  The tissue is sent to the lab to be looked at under a microscope. The procedure can take  anywhere from 5 to 60 minutes.      Before the Test:  ? If you take aspirin, medications containing aspirin, Plavix, or Coumadin, you MUST ask your doctor when you should stop taking the medicine. You may need to stop taking the medicine up to 7 days before the test.  ? You will have a blood sample drawn.  ? DO NOT DRINK OR EAT ANYTHING after midnight the day before the test (unless your doctor tells you otherwise).  ? You should take any medications you normally take (other than aspirin, Coumadin, etc.) with a small amount of clear liquid. Be sure to take any high blood pressure medications that your physicians has prescribed.  ? If you have diabetes, ask your Radiologist or Radiology Nurse if you should take your medicine or adjust the dosage before the test.  ? If this is a CT guided biopsy, please inform your doctor if you have an allergy to iodine or iodinated contrast. Special pills may be need to be prescribed prior to the test.      During the Test:  ? You will lie on your back.  ? You may be given medicine that will make your drowsy.  ? You will also be given a medication to numb the biopsy site.  ? You may feel pressure during the biopsy.       After the Test:  ? You will remain in bed 4 to 6 hours.  ? Your blood pressure, pulse and biopsy site will be checked often.     Follow these guidelines for a safe recovery:  ? Activity:  o The physician may have you lie on your right side for 2 hours.  o Limit activity for 24 hours after the test.  o No driving for 24 hours. You’ll need someone to drive you home. Someone should also stay with you the night of the biopsy.  o No heavy lifting or strenuous  activity for 2-3 days. Avoid intense exercise and contact sports for 2 weeks.  ? You may resume your normal diet.  ? Medicines:  o If you take aspirin, Coumadin, or medications that contain aspirin, ask your doctor when you should start medications after the test.  o You may take your other medications or ordered by your doctor.    Sedation Instructions:   If you’re given medicine to help you relax during the test (called a sedative), do not drive,  operate or use any heavy machinery, or drink alcohol for 24 hours after the test.     Call your ordering doctor RIGHT AWAY if you have:  ? Bleeding from the biopsy site. If bleeding occurs, put firm pressure on the area by lying on your right side on a hard, firm surface.  ? Shortness of breath or trouble breathing.  ? Increased pain at the biopsy site.  ? Dizziness or fainting.  ? If you are not able to contact your doctor, go to the nearest Emergency Room.     Also call your Doctor if you have:  ? Redness, swelling, pus-like drainage, or fluid at the procedure site.  ? Fever over 101 degrees F or night sweats.  ? Pain or tenderness at the biopsy site.  ? Any questions.      Keep dressing on for 2 days. Check the site for any signs of infection after removing the dressing. You may shower with the dressing on, but no baths/swimming/submerging underwater while dressing is in place. No heavy lifting (max 10lbs) for 2-3 days following the procedure. No strenuous activity or exercise for 1 week following the procedure.   If you have received sedation today, no driving, operating heavy machinery, drinking alcohol or making any important decisions for 24 hours post procedure.

## 2024-02-27 NOTE — PRE-PROCEDURE NOTE
Interventional Radiology Preprocedure Note    Indication for procedure: Diagnoses of Crohn's disease of large intestine with fistula (CMS/HCC), Liver fibrosis, and Elevated alkaline phosphatase level were pertinent to this visit.    Relevant review of systems: NA    Relevant Labs:   Lab Results   Component Value Date    CREATININE 1.11 08/31/2021    INR 1.1 02/19/2024    PROTIME 12.1 02/19/2024       Planned Sedation/Anesthesia: Moderate    Airway assessment: normal    Directed physical examination:    Physical Exam  Constitutional:       Appearance: Normal appearance.   Cardiovascular:      Rate and Rhythm: Normal rate and regular rhythm.   Pulmonary:      Effort: Pulmonary effort is normal.      Breath sounds: Normal breath sounds.   Neurological:      Mental Status: He is alert.          Mallampati: II (hard and soft palate, upper portion of tonsils anduvula visible)    ASA Score: ASA 2 - Patient with mild systemic disease with no functional limitations    Benefits, risks and alternatives of procedure and planned sedation have been discussed with the patient and/or their representative. All questions answered and they agree to proceed.

## 2024-02-27 NOTE — POST-PROCEDURE NOTE
Interventional Radiology Brief Postprocedure Note    Attending: Steve Avila MD      Assistant: none    Diagnosis: liver disease    Description of procedure: US guided random liver biopsy     Anesthesia:  moderate sedation    Complications: None    Estimated Blood Loss: none    Medications (Filter: Administrations occurring from 0907 to 0950 on 02/27/24) As of 02/27/24 0950      fentaNYL PF (Sublimaze) injection (mcg) Total dose:  50 mcg      Date/Time Rate/Dose/Volume Action       02/27/24  0935 50 mcg Given               midazolam (Versed) injection (mg) Total dose:  1 mg      Date/Time Rate/Dose/Volume Action       02/27/24  0935 1 mg Given               lidocaine with 8.4% sod bicarb (Buffered Xylocaine) 0.9 % injection (mL) Total volume:  10 mL      Date/Time Rate/Dose/Volume Action       02/27/24  0942 10 mL Given                   No specimens collected      See detailed result report with images in PACS.    The patient tolerated the procedure well without incident or complication and is in stable condition.

## 2024-02-29 LAB
LABORATORY COMMENT REPORT: NORMAL
PATH REPORT.FINAL DX SPEC: NORMAL
PATH REPORT.GROSS SPEC: NORMAL
PATH REPORT.RELEVANT HX SPEC: NORMAL
PATH REPORT.TOTAL CANCER: NORMAL

## 2024-02-29 PROCEDURE — RXMED WILLOW AMBULATORY MEDICATION CHARGE

## 2024-03-01 ENCOUNTER — PHARMACY VISIT (OUTPATIENT)
Dept: PHARMACY | Facility: CLINIC | Age: 54
End: 2024-03-01
Payer: MEDICAID

## 2024-03-06 NOTE — RESULT ENCOUNTER NOTE
Challenging case. He has biopsy proven cirrhosis, but unclear etiology. Will continue to evaluate possibilities listed in the report.

## 2024-03-26 DIAGNOSIS — K50.113 CROHN'S DISEASE OF LARGE INTESTINE WITH FISTULA (MULTI): ICD-10-CM

## 2024-03-26 RX ORDER — USTEKINUMAB 90 MG/ML
INJECTION, SOLUTION SUBCUTANEOUS
Qty: 1 ML | Refills: 5 | Status: SHIPPED | OUTPATIENT
Start: 2024-03-26 | End: 2025-03-26

## 2024-03-27 ENCOUNTER — HOSPITAL ENCOUNTER (EMERGENCY)
Facility: HOSPITAL | Age: 54
Discharge: HOME | End: 2024-03-27
Payer: COMMERCIAL

## 2024-03-27 VITALS
TEMPERATURE: 100.1 F | HEIGHT: 65 IN | OXYGEN SATURATION: 97 % | DIASTOLIC BLOOD PRESSURE: 64 MMHG | SYSTOLIC BLOOD PRESSURE: 140 MMHG | WEIGHT: 174.16 LBS | BODY MASS INDEX: 29.02 KG/M2 | HEART RATE: 80 BPM | RESPIRATION RATE: 18 BRPM

## 2024-03-27 DIAGNOSIS — J02.0 STREP PHARYNGITIS: Primary | ICD-10-CM

## 2024-03-27 DIAGNOSIS — R50.9 FEVER, UNSPECIFIED FEVER CAUSE: ICD-10-CM

## 2024-03-27 DIAGNOSIS — R50.9 CHILLS WITH FEVER: ICD-10-CM

## 2024-03-27 LAB
FLUAV RNA RESP QL NAA+PROBE: NOT DETECTED
FLUBV RNA RESP QL NAA+PROBE: NOT DETECTED
RSV RNA RESP QL NAA+PROBE: NOT DETECTED
S PYO DNA THROAT QL NAA+PROBE: DETECTED
SARS-COV-2 RNA RESP QL NAA+PROBE: NOT DETECTED

## 2024-03-27 PROCEDURE — RXMED WILLOW AMBULATORY MEDICATION CHARGE

## 2024-03-27 PROCEDURE — 99283 EMERGENCY DEPT VISIT LOW MDM: CPT | Mod: 25

## 2024-03-27 PROCEDURE — 96372 THER/PROPH/DIAG INJ SC/IM: CPT

## 2024-03-27 PROCEDURE — 2500000002 HC RX 250 W HCPCS SELF ADMINISTERED DRUGS (ALT 637 FOR MEDICARE OP, ALT 636 FOR OP/ED): Performed by: FAMILY MEDICINE

## 2024-03-27 PROCEDURE — 87637 SARSCOV2&INF A&B&RSV AMP PRB: CPT | Performed by: FAMILY MEDICINE

## 2024-03-27 PROCEDURE — 87651 STREP A DNA AMP PROBE: CPT | Performed by: FAMILY MEDICINE

## 2024-03-27 PROCEDURE — 2500000004 HC RX 250 GENERAL PHARMACY W/ HCPCS (ALT 636 FOR OP/ED): Mod: JZ | Performed by: FAMILY MEDICINE

## 2024-03-27 PROCEDURE — 94640 AIRWAY INHALATION TREATMENT: CPT | Mod: 59

## 2024-03-27 RX ORDER — ACETAMINOPHEN 325 MG/1
975 TABLET ORAL ONCE
Status: COMPLETED | OUTPATIENT
Start: 2024-03-27 | End: 2024-03-27

## 2024-03-27 RX ORDER — IPRATROPIUM BROMIDE AND ALBUTEROL SULFATE 2.5; .5 MG/3ML; MG/3ML
3 SOLUTION RESPIRATORY (INHALATION) ONCE
Status: COMPLETED | OUTPATIENT
Start: 2024-03-27 | End: 2024-03-27

## 2024-03-27 RX ORDER — ACETAMINOPHEN 325 MG/1
650 TABLET ORAL ONCE
Status: DISCONTINUED | OUTPATIENT
Start: 2024-03-27 | End: 2024-03-27 | Stop reason: HOSPADM

## 2024-03-27 RX ADMIN — ACETAMINOPHEN 975 MG: 325 TABLET ORAL at 16:44

## 2024-03-27 RX ADMIN — IPRATROPIUM BROMIDE AND ALBUTEROL SULFATE 3 ML: 2.5; .5 SOLUTION RESPIRATORY (INHALATION) at 17:48

## 2024-03-27 RX ADMIN — PENICILLIN G BENZATHINE 1.2 MILLION UNITS: 1200000 INJECTION, SUSPENSION INTRAMUSCULAR at 17:42

## 2024-03-27 ASSESSMENT — PAIN - FUNCTIONAL ASSESSMENT
PAIN_FUNCTIONAL_ASSESSMENT: 0-10
PAIN_FUNCTIONAL_ASSESSMENT: 0-10

## 2024-03-27 ASSESSMENT — PAIN SCALES - GENERAL
PAINLEVEL_OUTOF10: 0 - NO PAIN
PAINLEVEL_OUTOF10: 6

## 2024-03-27 ASSESSMENT — PAIN DESCRIPTION - PAIN TYPE: TYPE: ACUTE PAIN

## 2024-03-27 ASSESSMENT — PAIN DESCRIPTION - PROGRESSION: CLINICAL_PROGRESSION: GRADUALLY IMPROVING

## 2024-03-27 ASSESSMENT — COLUMBIA-SUICIDE SEVERITY RATING SCALE - C-SSRS
2. HAVE YOU ACTUALLY HAD ANY THOUGHTS OF KILLING YOURSELF?: NO
6. HAVE YOU EVER DONE ANYTHING, STARTED TO DO ANYTHING, OR PREPARED TO DO ANYTHING TO END YOUR LIFE?: NO
1. IN THE PAST MONTH, HAVE YOU WISHED YOU WERE DEAD OR WISHED YOU COULD GO TO SLEEP AND NOT WAKE UP?: NO

## 2024-03-27 NOTE — DISCHARGE INSTRUCTIONS
You are seen today for 1 day history of weakness, sore throat, fever, chills, and increased wheezing.    Your test revealed you have strep pharyngitis.  You were treated with penicillin G 1,200,000 units IM.  This should be the only treatment you need.  Recommend avoiding contact with others for the next 24 hours.  Do not share utensils, cooking ware, etc.    Commend salt water gargle 4 times daily, alternating Tylenol and ibuprofen every 3 hours as needed for pain and fever.  Increase daily water intake

## 2024-03-27 NOTE — ED PROVIDER NOTES
HPI   Chief Complaint   Patient presents with    Flu Symptoms     Pt presents to er with complaints of flu like symptoms, sore throat and being lethargic x 2 days. Pt states his family has been sick but uncertain of their dx. Pt triaged to ed bed 1        54-year-old gentleman with a history of asthma -presents with a 1 day history of extreme fatigue, feeling hot and cold, with chills, sore throat, and a fever to 101-102.  He describes some shortness of breath and wheezing as well.  He describes no known sick contacts, although his mother, who he picks up from work daily, works at a  center.  He reports his mother has not been sick.      History provided by:  Patient   used: No                        No data recorded                     Patient History   Past Medical History:   Diagnosis Date    Asthma ([facility])     Chronic iridocyclitis, unspecified eye 10/30/2013    Chronic iritis    Crohn's disease of large intestine with fistula (Multi) 07/07/2022    Crohn's disease of large intestine with fistula    Enterocolitis due to Clostridium difficile, not specified as recurrent 03/05/2019    C. difficile colitis    Other conditions influencing health status 10/30/2013    Endothelial Corneal Dystrophy    Personal history of other diseases of the respiratory system     Personal history of asthma     Past Surgical History:   Procedure Laterality Date    OTHER SURGICAL HISTORY  10/08/2021    Anal seton placement    OTHER SURGICAL HISTORY  08/26/2014    Brain Surgery     No family history on file.  Social History     Tobacco Use    Smoking status: Some Days     Types: Cigarettes, Cigars    Smokeless tobacco: Never   Substance Use Topics    Alcohol use: Never    Drug use: Yes     Types: Marijuana       Physical Exam   ED Triage Vitals [03/27/24 1618]   Temperature Heart Rate Respirations BP   (!) 38.6 °C (101.5 °F) 80 18 140/64      Pulse Ox Temp Source Heart Rate Source Patient Position   97 % Oral  -- --      BP Location FiO2 (%)     -- --       Physical Exam  Vitals and nursing note reviewed.   Constitutional:       Appearance: Normal appearance. He is normal weight.      Comments: Mildly ill-appearing gentleman   HENT:      Head: Normocephalic.      Mouth/Throat:      Mouth: Mucous membranes are dry.      Pharynx: No oropharyngeal exudate or posterior oropharyngeal erythema.   Eyes:      Extraocular Movements: Extraocular movements intact.      Conjunctiva/sclera: Conjunctivae normal.   Cardiovascular:      Rate and Rhythm: Normal rate and regular rhythm.      Heart sounds: Normal heart sounds.   Pulmonary:      Effort: Pulmonary effort is normal.   Skin:     General: Skin is warm and dry.   Neurological:      Mental Status: He is alert and oriented to person, place, and time.         ED Course & MDM   Diagnoses as of 04/17/24 1133   Strep pharyngitis   Fever, unspecified fever cause   Chills with fever       Medical Decision Making  Group A strep test was positive.    COVID RSV and influenza tests were negative.    Patient was given penicillin G 1,200,000 units IM, Tylenol 650 mg p.o.    Patient is instructed to avoid contact with others for the next 24 hours, avoid sharing utensils, use salt water gargle, and take Tylenol alternating with ibuprofen every 3 hours as needed for pain.    Amount and/or Complexity of Data Reviewed  External Data Reviewed: labs and notes.  Labs: ordered. Decision-making details documented in ED Course.        Procedure  Procedures     Preston Ron MD  03/27/24 9009       Preston Ron MD  04/17/24 1133

## 2024-04-01 ENCOUNTER — SPECIALTY PHARMACY (OUTPATIENT)
Dept: PHARMACY | Facility: CLINIC | Age: 54
End: 2024-04-01

## 2024-04-03 ENCOUNTER — PHARMACY VISIT (OUTPATIENT)
Dept: PHARMACY | Facility: CLINIC | Age: 54
End: 2024-04-03
Payer: MEDICAID

## 2024-04-03 NOTE — PROGRESS NOTES
"REASON FOR VISIT:  Crohn's disease    HPI:  Arturo Craft is a 54 y.o. male who presents for follow-up of Crohn's colitis and perianal Crohn's disease. Initially diagnosed as UC in 1990's.  Later developed perianal disease -->Crohn's disease.  Treated with 5-ASA and steroids on and off for years.  (+) HS with drainage intermittently in underarms     Started Humira in 3/2019; did OK but developed a treatment-related palmar-plantar psoriasis. 9/2019 changed to Stelara.      3/2020 FCP 34 (normal) and Stelara level 2.7 on every 8 week dosing; changed Q6 week dosing 6/2020.     12/2020 f/u Anser [facility]  on Stelara every 6 weeks = 4.8 with no antibodies.   FCP 52  CRP 1.42     6/2021 Stellara frequency from Q6 to Q4 for worsening/persistent perianal disease; since dose increased, has overall been doing well.    6/2023 colonoscopy OK; path (-)     Last seen 10/2023 and doing OK on every 4 week Stelara.  Moving bowels 3-4 daily.  No setons and no fistula problems.  No oral ulcers.  No joint inflammation, rash or eye inflammation.     (+) mild persistent LFT abnormalities; USG of liver with Doppler unrevealing.  ?correlate with change to every 4 week Stelara? Saw Dr. Hendrickson.  MRI w/ elastography suggests cirrhosis w/o portal HTN.  2/2024 liver biopsy shows cirrhosis of liver with minimal activity; etiology not apparent on histology. Unclear if  this is from PSC vs. Drug vs other.      Last week had fatigue, sore throat, and felt unwell.  RSV/COVI (-).  (+) group A strep.  Treated with PCN.    In follow-up today, off gabapentin and now on methacarbamol for hip pain.  He is feeling OK on every 4 week Stelara.  BMs most days 3-4 and formed stool.  No fistula issues; no setons.  Has noted \"blotches\" on arms, back, and trunk.  Some itching, worse at night.           REVIEW OF SYSTEMS    Allergies   Allergen Reactions    Budesonide Anaphylaxis     fever    Adalimumab Unknown    Aspirin Unknown    Entercote Unknown    Iodinated " Contrast Media GI Upset    Milk Unknown    Salicylates GI Upset    Salicylic Acid GI Upset    Sulfamethoxazole Unknown    Tomato Other       Past Medical History:   Diagnosis Date    Asthma     Chronic iridocyclitis, unspecified eye 10/30/2013    Chronic iritis    Crohn's disease of large intestine with fistula (CMS/HCC) 07/07/2022    Crohn's disease of large intestine with fistula    Enterocolitis due to Clostridium difficile, not specified as recurrent 03/05/2019    C. difficile colitis    Other conditions influencing health status 10/30/2013    Endothelial Corneal Dystrophy    Personal history of other diseases of the respiratory system     Personal history of asthma       Past Surgical History:   Procedure Laterality Date    OTHER SURGICAL HISTORY  10/08/2021    Anal seton placement    OTHER SURGICAL HISTORY  08/26/2014    Brain Surgery       Current Outpatient Medications   Medication Sig Dispense Refill    albuterol 2.5 mg /3 mL (0.083 %) nebulizer solution USE 3 ML VIA NEBULIZER EVERY 4 HOURS AS NEEDED FOR WHEEZING/SHORTNESS OF BREATH.      clobetasoL 0.05 % shampoo PLEASE SEE ATTACHED FOR DETAILED DIRECTIONS      dorzolamide-timoloL (Cosopt) 22.3-6.8 mg/mL ophthalmic solution USE 1 DROP IN BOTH EYES EVERY 12 HOURS.      Dulera 50-5 mcg/actuation HFA aerosol inhaler inhaler INHALE 2 PUFFS AS INSTRUCTED TWICE DAILY.      fluocinonide 0.05 % cream 1 Application      fluticasone (Flonase) 50 mcg/actuation nasal spray Administer into affected nostril(s) once daily.      gabapentin (Neurontin) 300 mg capsule Take 1 capsule (300 mg) by mouth 3 times a day.      lidocaine (Xylocaine) 5 % ointment APPLY TO AFFECTED AREAS AS DIRECTED.      loratadine (Claritin) 10 mg tablet Take 1 tablet (10 mg) by mouth once daily as needed.      methocarbamol (Robaxin) 500 mg tablet Take 1 tablet (500 mg) by mouth 3 times a day.      sildenafil (Viagra) 25 mg tablet TAKE 4 TABLETS BY MOUTH AS NEEDED 30-60 MINUTES BEFORE SEXUAL  "INTERCOURSE      Stelara injection Inject 1 mL (90 mg) under the skin 1 time for 1 dose. 1 mL 11    tadalafil (Cialis) 5 mg tablet Take by mouth.      ustekinumab (Stelara) injection INJECT 90MG (1 SYRINGE) UNDER THE SKIN ONCE EVERY 4 WEEKS. 1 mL 5    Ventolin HFA 90 mcg/actuation inhaler Inhale 2 puffs every 4 hours if needed.       No current facility-administered medications for this visit.       PHYSICAL EXAM:  /73   Pulse 66   Temp 36.9 °C (98.4 °F)   Ht 1.651 m (5' 5\")   Wt 77.9 kg (171 lb 11.2 oz)   BMI 28.57 kg/m²   Vital signs reviewed  Patient alert and oriented in no acute distress  Anicteric  No cervical adenopathy  Cardiac exam regular rate and rhythm S1-S2 without murmurs gallops or rubs  Lungs clear to auscultation bilaterally  Abdomen soft and nontender without organomegaly or mass.  No rebound or guarding.  Bowel sounds present  Extremities without edema      Lab Results   Component Value Date    WBC 8.4 02/19/2024    HGB 15.0 02/19/2024    HCT 45.5 02/19/2024    MCV 93 02/19/2024     02/19/2024     Lab Results   Component Value Date    ALT 75 (H) 02/19/2024    AST 68 (H) 02/19/2024    ALKPHOS 313 (H) 02/19/2024    BILITOT 1.1 02/19/2024     ASSESSMENT  #Crohn's disease- overall Crohn's disease is doing quite well on Stelara every 4 weeks.  His perianal disease has completely resolved.  He has good bowel habits without significant urgency or loose stools.  He has no bleeding.  He feels quite well on current therapy which he is tolerating without difficulty.  We will continue current treatment with Stelara.  Labs will be checked.  He will have a colonoscopy in the fall.    # Cirrhosis-he has cirrhosis with small esophageal varices so portal hypertension as well.  He is following with Dr. Hendrickson.  Etiology of this is not 100% certain.  Fortunately, he does not show any signs of significant deep compensated liver disease at this time.  It is unclear whether the itching is related to " his disease or not.    # Rash with itching-etiology is uncertain.  I do not think that this is related to Stelara therapy, though this is certainly possible.  We will send him to dermatology for evaluation.    Elevated blood pressure-his blood pressure is elevated today.  He will follow-up with his primary care physician for evaluation.  He has no symptoms from this at the current time.    PLAN  1) continue Stelara every 4 weeks.    2) check TB blood test  3) We will send you to dermatology for your rash.    4) Follow-up with your primary care physician for your elevated blood pressure.    5) Follow-up with me in 6 months  6) Follow-up with Dr. Hendrickson in late summer or fall as previously planned.  7) Your next colonoscopy will be in the summer of 2025

## 2024-04-04 ENCOUNTER — OFFICE VISIT (OUTPATIENT)
Dept: GASTROENTEROLOGY | Facility: CLINIC | Age: 54
End: 2024-04-04
Payer: COMMERCIAL

## 2024-04-04 VITALS
HEART RATE: 66 BPM | BODY MASS INDEX: 28.61 KG/M2 | HEIGHT: 65 IN | DIASTOLIC BLOOD PRESSURE: 73 MMHG | TEMPERATURE: 98.4 F | WEIGHT: 171.7 LBS | SYSTOLIC BLOOD PRESSURE: 153 MMHG

## 2024-04-04 DIAGNOSIS — K50.113 CROHN'S DISEASE OF LARGE INTESTINE WITH FISTULA (MULTI): Primary | ICD-10-CM

## 2024-04-04 DIAGNOSIS — R21 RASH AND OTHER NONSPECIFIC SKIN ERUPTION: ICD-10-CM

## 2024-04-04 DIAGNOSIS — K74.60 CIRRHOSIS OF LIVER WITHOUT ASCITES, UNSPECIFIED HEPATIC CIRRHOSIS TYPE (MULTI): ICD-10-CM

## 2024-04-04 PROCEDURE — 99214 OFFICE O/P EST MOD 30 MIN: CPT | Performed by: INTERNAL MEDICINE

## 2024-04-04 NOTE — PATIENT INSTRUCTIONS
It is great that you continue to feel well on Stelara therapy as regards control of your Crohn's disease.  As we discussed today:    1) continue Stelara every 4 weeks.    2) check TB blood test  3) We will send you to dermatology for your rash.    4) Follow-up with your primary care physician for your elevated blood pressure.    5) Follow-up with me in 6 months  6) Follow-up with Dr. Hendrickson in late summer or fall as previously planned.  7) Your next colonoscopy will be in the summer of 2025

## 2024-04-09 ENCOUNTER — OFFICE VISIT (OUTPATIENT)
Dept: DERMATOLOGY | Facility: CLINIC | Age: 54
End: 2024-04-09
Payer: COMMERCIAL

## 2024-04-09 DIAGNOSIS — D48.5 NEOPLASM OF UNCERTAIN BEHAVIOR OF SKIN: ICD-10-CM

## 2024-04-09 PROCEDURE — 88305 TISSUE EXAM BY PATHOLOGIST: CPT | Performed by: DERMATOLOGY

## 2024-04-09 PROCEDURE — 99203 OFFICE O/P NEW LOW 30 MIN: CPT | Performed by: NURSE PRACTITIONER

## 2024-04-09 PROCEDURE — 11104 PUNCH BX SKIN SINGLE LESION: CPT | Performed by: NURSE PRACTITIONER

## 2024-04-09 NOTE — PROGRESS NOTES
"Subjective     Arturo Craft is a 54 y.o. male who presents for the following: Rash (\"Rash\" to arms and upper back. Positive for itching. PMHX Chron's (Stelara). ).     Review of Systems:  No other skin or systemic complaints other than what is documented elsewhere in the note.    The following portions of the chart were reviewed this encounter and updated as appropriate:          Skin Cancer History  No skin cancer on file.      Specialty Problems          Dermatology Problems    Psoriasis vulgaris    Rash and other nonspecific skin eruption    Dry skin    Folliculitis    Psoriasis of scalp        Objective   Well appearing patient in no apparent distress; mood and affect are within normal limits.    A focused skin examination was performed. All findings within normal limits unless otherwise noted below.    Assessment/Plan   1. Neoplasm of uncertain behavior of skin  Left Upper Arm - Posterior  Scattered spider angiomas (5mm - 1.0cm) on upper trunk and upper extremities. Lesions appeared in 2020, stable since.   History of Crohn's colitis, perianal Crohn's, and cirrhosis. A 2/2024 liver biopsy shows cirrhosis of liver with minimal activity; etiology not apparent on histology. Unclear if the liver changes are due to PSC vs. Drug vs other. Crohn's controlled with Stelera every 4 weeks.           Lesion biopsy  Type of biopsy: punch    Informed consent: discussed and consent obtained    Timeout: patient name, date of birth, surgical site, and procedure verified    Procedure prep:  Patient was prepped and draped  Anesthesia: the lesion was anesthetized in a standard fashion    Anesthetic:  1% lidocaine w/ epinephrine 1-100,000 local infiltration  Punch size:  4 mm  Suture size:  4-0  Suture type: Prolene (polypropylene)    Suture removal (days):  14  Hemostasis achieved with: suture    Outcome: patient tolerated procedure well    Post-procedure details: wound care instructions given      Specimen 1 - Dermatopathology- " DERM LAB  Differential Diagnosis: r/o liver failure vs scar vs other  Check Margins Yes/No?:    Comments:    Dermpath Lab: Routine Histopathology (formalin-fixed tissue)    -  Discussed differential with patient. Likely spider angiomata due to liver disease.   - Given uncertainty of clinical diagnosis, a biopsy is recommended in clinic today.   - The patient expressed understanding, is in agreement with this plan, and wishes to proceed with biopsy.   - Oral and written wound care instructions provided.   - Advised the patient that the office will call within 2 weeks to discuss biopsy results.

## 2024-04-09 NOTE — LETTER
"April 9, 2024     Quique Collins MD  [id number]  Department Of Medicine-Gastroenterology  [facility] [address]    Patient: Arturo Craft   YOB: 1970   Date of Visit: 4/9/2024       Dear Dr. Quique Collins MD:    Thank you for referring Arturo Craft to me for evaluation. Below are my notes for this consultation.  If you have questions, please do not hesitate to call me. I look forward to following your patient along with you.       Sincerely,     Susan L Mayne, APRN-CNP      CC: No Recipients  ______________________________________________________________________________________    Subjective    Arturo Craft is a 54 y.o. male who presents for the following: Rash (\"Rash\" to arms and upper back. Positive for itching. PMHX Chron's (Stelara). ).     Review of Systems:  No other skin or systemic complaints other than what is documented elsewhere in the note.    The following portions of the chart were reviewed this encounter and updated as appropriate:          Skin Cancer History  No skin cancer on file.      Specialty Problems          Dermatology Problems    Psoriasis vulgaris    Rash and other nonspecific skin eruption    Dry skin    Folliculitis    Psoriasis of scalp        Objective  Well appearing patient in no apparent distress; mood and affect are within normal limits.    A focused skin examination was performed. All findings within normal limits unless otherwise noted below.    Assessment/Plan  1. Neoplasm of uncertain behavior of skin  Left Upper Arm - Posterior  Scattered spider angiomas (5mm - 1.0cm) on upper trunk and upper extremities. Lesions appeared in 2020, stable since.   History of Crohn's colitis, perianal Crohn's, and cirrhosis. A 2/2024 liver biopsy shows cirrhosis of liver with minimal activity; etiology not apparent on histology. Unclear if the liver changes are due to PSC vs. Drug vs other. Crohn's controlled with Stelera every 4 weeks.           Lesion biopsy  Type of " biopsy: punch    Informed consent: discussed and consent obtained    Timeout: patient name, date of birth, surgical site, and procedure verified    Procedure prep:  Patient was prepped and draped  Anesthesia: the lesion was anesthetized in a standard fashion    Anesthetic:  1% lidocaine w/ epinephrine 1-100,000 local infiltration  Punch size:  4 mm  Suture size:  4-0  Suture type: Prolene (polypropylene)    Suture removal (days):  14  Hemostasis achieved with: suture    Outcome: patient tolerated procedure well    Post-procedure details: wound care instructions given      Specimen 1 - Dermatopathology- DERM LAB  Differential Diagnosis: r/o liver failure vs scar vs other  Check Margins Yes/No?:    Comments:    Dermpath Lab: Routine Histopathology (formalin-fixed tissue)    -  Discussed differential with patient. Likely spider angiomata due to liver disease.   - Given uncertainty of clinical diagnosis, a biopsy is recommended in clinic today.   - The patient expressed understanding, is in agreement with this plan, and wishes to proceed with biopsy.   - Oral and written wound care instructions provided.   - Advised the patient that the office will call within 2 weeks to discuss biopsy results.

## 2024-04-11 LAB
LABORATORY COMMENT REPORT: NORMAL
PATH REPORT.FINAL DX SPEC: NORMAL
PATH REPORT.GROSS SPEC: NORMAL
PATH REPORT.MICROSCOPIC SPEC OTHER STN: NORMAL
PATH REPORT.RELEVANT HX SPEC: NORMAL
PATH REPORT.TOTAL CANCER: NORMAL

## 2024-04-22 ENCOUNTER — LAB (OUTPATIENT)
Dept: LAB | Facility: LAB | Age: 54
End: 2024-04-22
Payer: COMMERCIAL

## 2024-04-22 ENCOUNTER — OFFICE VISIT (OUTPATIENT)
Dept: DERMATOLOGY | Facility: CLINIC | Age: 54
End: 2024-04-22
Payer: COMMERCIAL

## 2024-04-22 DIAGNOSIS — K50.113 CROHN'S DISEASE OF LARGE INTESTINE WITH FISTULA (MULTI): ICD-10-CM

## 2024-04-22 DIAGNOSIS — I78.1 SPIDER ANGIOMA: Primary | ICD-10-CM

## 2024-04-22 PROCEDURE — 36415 COLL VENOUS BLD VENIPUNCTURE: CPT

## 2024-04-22 PROCEDURE — 86481 TB AG RESPONSE T-CELL SUSP: CPT

## 2024-04-22 PROCEDURE — 99024 POSTOP FOLLOW-UP VISIT: CPT | Performed by: NURSE PRACTITIONER

## 2024-04-22 NOTE — PROGRESS NOTES
Subjective     Arturo Craft is a 54 y.o. male who presents for the following: Suture / Staple Removal (S/p 2 week punch biopsy to posterior left upper arm. ).     Review of Systems:  No other skin or systemic complaints other than what is documented elsewhere in the note.    The following portions of the chart were reviewed this encounter and updated as appropriate:   Tobacco  Allergies  Meds  Problems  Med Hx  Surg Hx  Fam Hx         Skin Cancer History  No skin cancer on file.      Specialty Problems          Dermatology Problems    Psoriasis vulgaris    Rash and other nonspecific skin eruption    Dry skin    Folliculitis    Psoriasis of scalp        Objective   Well appearing patient in no apparent distress; mood and affect are within normal limits.    A focused skin examination was performed. All findings within normal limits unless otherwise noted below.    Assessment/Plan

## 2024-04-24 ENCOUNTER — SPECIALTY PHARMACY (OUTPATIENT)
Dept: PHARMACY | Facility: CLINIC | Age: 54
End: 2024-04-24

## 2024-04-24 LAB
NIL(NEG) CONTROL SPOT COUNT: NORMAL
PANEL A SPOT COUNT: 0
PANEL B SPOT COUNT: 1
POS CONTROL SPOT COUNT: NORMAL
T-SPOT. TB INTERPRETATION: NEGATIVE

## 2024-04-24 PROCEDURE — RXMED WILLOW AMBULATORY MEDICATION CHARGE

## 2024-04-25 ENCOUNTER — PHARMACY VISIT (OUTPATIENT)
Dept: PHARMACY | Facility: CLINIC | Age: 54
End: 2024-04-25
Payer: MEDICAID

## 2024-05-22 PROCEDURE — RXMED WILLOW AMBULATORY MEDICATION CHARGE

## 2024-05-29 ENCOUNTER — SPECIALTY PHARMACY (OUTPATIENT)
Dept: PHARMACY | Facility: CLINIC | Age: 54
End: 2024-05-29

## 2024-05-30 ENCOUNTER — PHARMACY VISIT (OUTPATIENT)
Dept: PHARMACY | Facility: CLINIC | Age: 54
End: 2024-05-30
Payer: MEDICAID

## 2024-06-20 ENCOUNTER — OFFICE VISIT (OUTPATIENT)
Dept: GASTROENTEROLOGY | Facility: CLINIC | Age: 54
End: 2024-06-20
Payer: COMMERCIAL

## 2024-06-20 VITALS
WEIGHT: 169.8 LBS | TEMPERATURE: 97.3 F | HEART RATE: 56 BPM | DIASTOLIC BLOOD PRESSURE: 66 MMHG | BODY MASS INDEX: 28.29 KG/M2 | HEIGHT: 65 IN | SYSTOLIC BLOOD PRESSURE: 133 MMHG

## 2024-06-20 DIAGNOSIS — K50.113 CROHN'S DISEASE OF LARGE INTESTINE WITH FISTULA (MULTI): ICD-10-CM

## 2024-06-20 DIAGNOSIS — K83.1 CHOLESTATIC LIVER DISEASE (CMS-HCC): Primary | ICD-10-CM

## 2024-06-20 DIAGNOSIS — L29.8 CHOLESTATIC PRURITUS: ICD-10-CM

## 2024-06-20 DIAGNOSIS — R74.8 ELEVATED ALKALINE PHOSPHATASE LEVEL: ICD-10-CM

## 2024-06-20 DIAGNOSIS — K74.69 CIRRHOSIS, CRYPTOGENIC (MULTI): ICD-10-CM

## 2024-06-20 PROCEDURE — RXMED WILLOW AMBULATORY MEDICATION CHARGE

## 2024-06-20 PROCEDURE — 99214 OFFICE O/P EST MOD 30 MIN: CPT | Performed by: INTERNAL MEDICINE

## 2024-06-20 RX ORDER — CHOLESTYRAMINE 4 G/9G
1 POWDER, FOR SUSPENSION ORAL DAILY
Qty: 30 PACKET | Refills: 11 | Status: SHIPPED | OUTPATIENT
Start: 2024-06-20 | End: 2025-06-20

## 2024-06-20 RX ORDER — TIOTROPIUM BROMIDE INHALATION SPRAY 3.12 UG/1
2 SPRAY, METERED RESPIRATORY (INHALATION)
COMMUNITY
Start: 2024-04-26

## 2024-06-20 ASSESSMENT — PAIN SCALES - GENERAL: PAINLEVEL: 5

## 2024-06-20 NOTE — PATIENT INSTRUCTIONS
"Welcome to Dr[facility].  Dr. Hendrickson sees patients at the following sites:  [place] [address] [facility]  [address], [address] at [facility] at [facility], [facility] [address]    Dr. Hendrickson's hepatology care coordinator, Viridiana REECE, can be reached at [phone].  Dr. Hendrickson's , Jane Amaro, can be reached at [phone].    I would like for you to get a MRCP in August 2024.  Call [phone] to schedule.    Get blood work now.     I sent a prescription for Questran to your pharmacy to help with itching.     Get a Fibroscan of your Liver in 1 year. Please complete prior to your next follow up visit.  Do not eat or drink anything 3 hours prior to your exam.   Schedule on the way out from your visit today, or call [phone].  When calling and after prompts, state \"make an appointment,\" and then \"gastro\" to get to the appropriate .     Follow up with me in clinic in 1 year.   Call [phone] to schedule. Do this around October/November.   "

## 2024-06-20 NOTE — LETTER
June 26, 2024     Quique Collins MD  [id number] [address]  Department Of Medicine-Gastroenterology  [facility] [address]    Patient: Arturo Craft   YOB: 1970   Date of Visit: 6/20/2024       Dear Dr. Quique Collins MD:    Thank you for referring Arturo Craft to me for evaluation. Below are my notes for this consultation.  If you have questions, please do not hesitate to call me. I look forward to following your patient along with you.       Sincerely,     Franky Hendrickson MD      CC: No Recipients  ______________________________________________________________________________________    Subjective     Follow up visit - elevated alk phos, chronic cholestatic liver disease.    History of Present Illness:   Arturo Craft is a 54 y.o. male who presents to the [facility] for follow up of cholestatic liver disease and cirrhosis.    Here for follow up management.  Had a liver biopsy earlier this year.    He has a long-standing Crohn's colitis and perianal Crohn's disease.  He was actually, Initially diagnosed as having ulcerative colitis. However, he later developed perianal disease and his diagnosis was changed to Crohn's disease.   He was treated with 5-ASA and steroids on and off for years. He started on Humira in 3/2019; tolerated Humira well. However he developed a treatment-related palmar-plantar psoriasis and Humira was discontinued. Sept. 2019 he was started on Stelara.     Timeline of some IBD related complications and medication changes:  -11/2019 had new anterior perianal abscess; had EUA and abscess drained, but no new fistula.   -(+) HS with drainage intermittently in underarms  - March to June 2020 Stelera q8 week dosing changed to q6 week dosing.  - perianal pain and new fistula site.   - 12/2020 continued drainage   - 1/2021 colonoscopy showed quiescent pancolonic disease with small pseudopolyps.   - 6/2021 Stellara frequency was increased to Q6 to Q4 for worsening/persistent perianal  disease ; since dose increased, has overall been doing well.     Alk phos first elevated in Dec 2020: 191 U/L  Persistently elevated in 2021: Alk phos 256 (8/21) --> 214 ( Sept 2021).  Elevated further  374 U/L  (March 2023). AST and ALT also elevated.   US of liver with Doppler study unremarkable.      At first, concern that the ALK elevation may temporally correlate with change to every 4 week Stelara (which occurred in mid-2021).     Differential diagnosis also included PSC, given his longstanding history of IBD.    Labwork/serologies : AMA neg, LK microsomal neg, Soluble liver ag: neg, ANCA neg  IGG elevated at 1820.    MRI MRCP with elastography completed on 8/22/23. Prefers not to go downtown for another elastography.     Had a liver biopsy in February 2024.    Continue to have itching.   He has a mild rash especially on his arms. He is seeing dermatology.   Denies GI bleeding. Denies swelling. Denies confusion.    Review of Systems  Review of Systems    Daily itch - total body itching  At times severe  Maybe 3 days per week.  Takes a medium temp shower.  Uses a soap.  Does not wake up at night.  Not worse at nigh time.    Past Medical History   has a past medical history of Asthma ([facility]), Chronic iridocyclitis, unspecified eye (10/30/2013), Crohn's disease of large intestine with fistula (Multi) (07/07/2022), Enterocolitis due to Clostridium difficile, not specified as recurrent (03/05/2019), Other conditions influencing health status (10/30/2013), and Personal history of other diseases of the respiratory system.     Social History   reports that he has been smoking cigarettes and cigars. He has never used smokeless tobacco. He reports current drug use. Drug: Marijuana. He reports that he does not drink alcohol.     Family History  family history is not on file.     Allergies  Allergies   Allergen Reactions   • Budesonide Anaphylaxis     fever   • Adalimumab Unknown   • Aspirin Unknown   • Entercote  "Unknown   • Iodinated Contrast Media GI Upset   • Milk Unknown   • Salicylates GI Upset   • Salicylic Acid GI Upset   • Sulfamethoxazole Unknown   • Tomato Other       Medications  Current Outpatient Medications   Medication Instructions   • albuterol 2.5 mg /3 mL (0.083 %) nebulizer solution USE 3 ML VIA NEBULIZER EVERY 4 HOURS AS NEEDED FOR WHEEZING/SHORTNESS OF BREATH.   • clobetasoL 0.05 % shampoo PLEASE SEE ATTACHED FOR DETAILED DIRECTIONS   • dorzolamide-timoloL (Cosopt) 22.3-6.8 mg/mL ophthalmic solution USE 1 DROP IN BOTH EYES EVERY 12 HOURS.   • Dulera 50-5 mcg/actuation HFA aerosol inhaler inhaler INHALE 2 PUFFS AS INSTRUCTED TWICE DAILY.   • fluocinonide 0.05 % cream 1 Application   • fluticasone (Flonase) 50 mcg/actuation nasal spray nasal, Daily RT   • lidocaine (Xylocaine) 5 % ointment APPLY TO AFFECTED AREAS AS DIRECTED.   • loratadine (CLARITIN) 10 mg, oral, Daily PRN   • methocarbamol (ROBAXIN) 500 mg, oral, 3 times daily   • sildenafil (Viagra) 25 mg tablet TAKE 4 TABLETS BY MOUTH AS NEEDED 30-60 MINUTES BEFORE SEXUAL INTERCOURSE   • Spiriva Respimat 2.5 mcg/actuation inhaler 2 puffs, inhalation, Daily RT   • ustekinumab (Stelara) injection INJECT 90MG (1 SYRINGE) UNDER THE SKIN ONCE EVERY 4 WEEKS.   • Ventolin HFA 90 mcg/actuation inhaler 2 puffs, inhalation, Every 4 hours PRN        Objective   Visit Vitals  /66   Pulse 56 Comment: low hr   Temp 36.3 °C (97.3 °F) (Temporal)          2/27/2024    11:00 AM 2/27/2024    11:30 AM 2/27/2024    11:45 AM 3/27/2024     4:18 PM 3/27/2024     5:49 PM 4/4/2024    10:07 AM 6/20/2024     8:43 AM   Vitals   Systolic 132 141 141 140  153 133   Diastolic 89 84 94 64  73 66   Heart Rate 60 60 58 80  66 56   Temp    38.6 °C (101.5 °F) 37.8 °C (100.1 °F) 36.9 °C (98.4 °F) 36.3 °C (97.3 °F)   Resp 18 18 16 18      Height (in)    1.651 m (5' 5\")  1.651 m (5' 5\") 1.651 m (5' 5\")   Weight (lb)    174.16  171.7 169.8   BMI    28.98 kg/m2  28.57 kg/m2 28.26 kg/m2 "   BSA (m2)    1.9 m2  1.89 m2 1.88 m2   Visit Report      Report Report     Physical Exam  Vitals reviewed.   Constitutional:       General: He is awake.      Appearance: Normal appearance.   HENT:      Head: Normocephalic and atraumatic.      Nose: Nose normal.      Mouth/Throat:      Mouth: Mucous membranes are moist.   Eyes:      Pupils: Pupils are equal, round, and reactive to light.   Cardiovascular:      Rate and Rhythm: Normal rate.   Pulmonary:      Effort: Pulmonary effort is normal.   Abdominal:      Comments: Liver edge hard   Skin:     Comments: Rash on upper arms  petechial   Neurological:      Mental Status: He is alert and oriented to person, place, and time. Mental status is at baseline.   Psychiatric:         Attention and Perception: Attention and perception normal.         Mood and Affect: Mood normal.         Behavior: Behavior normal.     Labs    WBC   Date/Time Value Ref Range Status   02/19/2024 07:54 AM 8.4 4.4 - 11.3 x10*3/uL Final     Hemoglobin   Date/Time Value Ref Range Status   02/19/2024 07:54 AM 15.0 13.5 - 17.5 g/dL Final     Hematocrit   Date/Time Value Ref Range Status   02/19/2024 07:54 AM 45.5 41.0 - 52.0 % Final     MCV   Date/Time Value Ref Range Status   02/19/2024 07:54 AM 93 80 - 100 fL Final     Platelets   Date/Time Value Ref Range Status   02/19/2024 07:54  150 - 450 x10*3/uL Final        Total Protein   Date/Time Value Ref Range Status   02/19/2024 07:54 AM 7.9 6.4 - 8.2 g/dL Final     Albumin   Date/Time Value Ref Range Status   02/19/2024 07:54 AM 4.0 3.4 - 5.0 g/dL Final     AST   Date/Time Value Ref Range Status   02/19/2024 07:54 AM 68 (H) 9 - 39 U/L Final     ALT   Date/Time Value Ref Range Status   02/19/2024 07:54 AM 75 (H) 10 - 52 U/L Final     Comment:     Patients treated with Sulfasalazine may generate falsely decreased results for ALT.     Alkaline Phosphatase   Date/Time Value Ref Range Status   02/19/2024 07:54  (H) 33 - 120 U/L Final      Bilirubin, Total   Date/Time Value Ref Range Status   02/19/2024 07:54 AM 1.1 0.0 - 1.2 mg/dL Final     Bilirubin, Direct   Date/Time Value Ref Range Status   02/19/2024 07:54 AM 0.3 0.0 - 0.3 mg/dL Final        Vitamin D, 25-Hydroxy   Date/Time Value Ref Range Status   08/31/2021 02:57 PM 49 ng/mL Final     Comment:     .  DEFICIENCY:         < 20   NG/ML  INSUFFICIENCY:      20-29  NG/ML  SUFFICIENCY:         NG/ML    THIS ASSAY ACCURATELY QUANTIFIES THE SUM OF  VITAMIN D3, 25-HYDROXY AND VIT D2,25-HYDROXY.          AST   Date/Time Value Ref Range Status   02/19/2024 07:54 AM 68 (H) 9 - 39 U/L Final     ALT   Date/Time Value Ref Range Status   02/19/2024 07:54 AM 75 (H) 10 - 52 U/L Final     Comment:     Patients treated with Sulfasalazine may generate falsely decreased results for ALT.     Alkaline Phosphatase   Date/Time Value Ref Range Status   02/19/2024 07:54  (H) 33 - 120 U/L Final     Bilirubin, Total   Date/Time Value Ref Range Status   02/19/2024 07:54 AM 1.1 0.0 - 1.2 mg/dL Final     Bilirubin, Direct   Date/Time Value Ref Range Status   02/19/2024 07:54 AM 0.3 0.0 - 0.3 mg/dL Final     Albumin   Date/Time Value Ref Range Status   02/19/2024 07:54 AM 4.0 3.4 - 5.0 g/dL Final     Total Protein   Date/Time Value Ref Range Status   02/19/2024 07:54 AM 7.9 6.4 - 8.2 g/dL Final        Protime   Date/Time Value Ref Range Status   02/19/2024 07:54 AM 12.1 9.8 - 12.8 seconds Final     INR   Date/Time Value Ref Range Status   02/19/2024 07:54 AM 1.1 0.9 - 1.1 Final       MRI     IMPRESSION:  1. Cirrhotic morphology of the liver without evidence of portal  hypertension. MR elastography demonstrates stage 3-4 fibrosis.  2. Hepatic segment VII hemangioma. No suspicious liver lesions.  3. 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    Pathology  Liver biopsy  FINAL DIAGNOSIS   A.  Liver,  right, needle core biopsy:  Cirrhosis of liver with minimal activity; etiology not apparent on histology.  See microscopic description.     MICROSCOPIC DESCRIPTION:     The biopsy shows distortion of the hepatic architecture by nodules of regenerating hepatic parenchyma surrounded by fibrosis, consistent with cirrhosis, confirmed by the trichrome and reticulin stains.  The fibrous septa show minimal chronic inflammation composed of small mature appearing lymphocytes and rare plasma cells.  Interface activity is minimal.  There is focal perivenular inflammation. The native interlobular bile ducts appear intact without evidence of injury.  Granulomas or cholestasis is not seen. Steatosis is not identified.  The iron stain shows trace storage iron.  PAS stain after diastase digestion does not highlight diagnostic cytoplasmic inclusions.     Cirrhosis represents the final sequelae of multiple hepatic insults. The differential diagnosis for the etiology is broad and includes hepatotrophic viral infections, drug/toxin mediated liver damage, common causes of steatohepatitis as well as autoimmune hepatitis. Note is made of the elevated serum IgG and alkaline phosphatase levels. Features of a primary biliary disorder are not identified in this specimen. End-stage of partially treated autoimmune hepatitis or drug/medication-related injury remains a possibility. Correlation of clinical and serologic findings will be helpful to determine the etiology for cirrhosis.     Assessment/Plan   Arturo Craft is a 54 y.o. male who presents to Liver clinic for for follow up for  chronic cholestatic liver disease/persistently abnormal ALK and cirrhosis.    Impression:  PSC remains a possibility  given longstanding Crohn's colitis despite non diagnostic MRI and Liver biopsy. I do note the elevated IGG and IGG4 levels. About 10% of PSC patients will have elevated IGG4. This could be a clue to IGG4 cholangiopathy as well. Otherwise, drug  induced liver injury from Stelera (especially as dosing interval has shortened) is possible. Less likely AIH, PBC, sarcoidosis. As of now, cirrhosis is cryptogenic.     Will repeat MRI/MRCP for follow up assessment.   Prefers not do a MR Elastography, so will conduct follow up elastography with Fibroscan..     Has significant cholestatic pruritus.    Plan:  Diagnosis remains elusive. He is cirrhotic.  Will prescribe Cholestyramine for itching.  MRI MRCP surveillance.     SHAI Hendrickson MD    Instructions      Franky Hendrickson MD

## 2024-06-20 NOTE — PROGRESS NOTES
Subjective     Follow up visit - elevated alk phos, chronic cholestatic liver disease.    History of Present Illness:   Arturo Craft is a 54 y.o. male who presents to the [facility] for follow up of cholestatic liver disease and cirrhosis.    Here for follow up management.  Had a liver biopsy earlier this year.    He has a long-standing Crohn's colitis and perianal Crohn's disease.  He was actually, Initially diagnosed as having ulcerative colitis. However, he later developed perianal disease and his diagnosis was changed to Crohn's disease.   He was treated with 5-ASA and steroids on and off for years. He started on Humira in 3/2019; tolerated Humira well. However he developed a treatment-related palmar-plantar psoriasis and Humira was discontinued. Sept. 2019 he was started on Stelara.     Timeline of some IBD related complications and medication changes:  -11/2019 had new anterior perianal abscess; had EUA and abscess drained, but no new fistula.   -(+) HS with drainage intermittently in underarms  - March to June 2020 Stelera q8 week dosing changed to q6 week dosing.  - perianal pain and new fistula site.   - 12/2020 continued drainage   - 1/2021 colonoscopy showed quiescent pancolonic disease with small pseudopolyps.   - 6/2021 Stellara frequency was increased to Q6 to Q4 for worsening/persistent perianal disease ; since dose increased, has overall been doing well.     Alk phos first elevated in Dec 2020: 191 U/L  Persistently elevated in 2021: Alk phos 256 (8/21) --> 214 ( Sept 2021).  Elevated further  374 U/L  (March 2023). AST and ALT also elevated.   US of liver with Doppler study unremarkable.      At first, concern that the ALK elevation may temporally correlate with change to every 4 week Stelara (which occurred in mid-2021).     Differential diagnosis also included PSC, given his longstanding history of IBD.    Labwork/serologies : AMA neg, LK microsomal neg, Soluble liver ag: neg, ANCA  neg  IGG elevated at 1820.    MRI MRCP with elastography completed on 8/22/23. Prefers not to go downtown for another elastography.     Had a liver biopsy in February 2024.    Continue to have itching.   He has a mild rash especially on his arms. He is seeing dermatology.   Denies GI bleeding. Denies swelling. Denies confusion.    Review of Systems  Review of Systems    Daily itch - total body itching  At times severe  Maybe 3 days per week.  Takes a medium temp shower.  Uses a soap.  Does not wake up at night.  Not worse at nigh time.    Past Medical History   has a past medical history of Asthma ([facility]), Chronic iridocyclitis, unspecified eye (10/30/2013), Crohn's disease of large intestine with fistula (Multi) (07/07/2022), Enterocolitis due to Clostridium difficile, not specified as recurrent (03/05/2019), Other conditions influencing health status (10/30/2013), and Personal history of other diseases of the respiratory system.     Social History   reports that he has been smoking cigarettes and cigars. He has never used smokeless tobacco. He reports current drug use. Drug: Marijuana. He reports that he does not drink alcohol.     Family History  family history is not on file.     Allergies  Allergies   Allergen Reactions    Budesonide Anaphylaxis     fever    Adalimumab Unknown    Aspirin Unknown    Entercote Unknown    Iodinated Contrast Media GI Upset    Milk Unknown    Salicylates GI Upset    Salicylic Acid GI Upset    Sulfamethoxazole Unknown    Tomato Other       Medications  Current Outpatient Medications   Medication Instructions    albuterol 2.5 mg /3 mL (0.083 %) nebulizer solution USE 3 ML VIA NEBULIZER EVERY 4 HOURS AS NEEDED FOR WHEEZING/SHORTNESS OF BREATH.    clobetasoL 0.05 % shampoo PLEASE SEE ATTACHED FOR DETAILED DIRECTIONS    dorzolamide-timoloL (Cosopt) 22.3-6.8 mg/mL ophthalmic solution USE 1 DROP IN BOTH EYES EVERY 12 HOURS.    Dulera 50-5 mcg/actuation HFA aerosol inhaler inhaler INHALE  "2 PUFFS AS INSTRUCTED TWICE DAILY.    fluocinonide 0.05 % cream 1 Application    fluticasone (Flonase) 50 mcg/actuation nasal spray nasal, Daily RT    lidocaine (Xylocaine) 5 % ointment APPLY TO AFFECTED AREAS AS DIRECTED.    loratadine (CLARITIN) 10 mg, oral, Daily PRN    methocarbamol (ROBAXIN) 500 mg, oral, 3 times daily    sildenafil (Viagra) 25 mg tablet TAKE 4 TABLETS BY MOUTH AS NEEDED 30-60 MINUTES BEFORE SEXUAL INTERCOURSE    Spiriva Respimat 2.5 mcg/actuation inhaler 2 puffs, inhalation, Daily RT    ustekinumab (Stelara) injection INJECT 90MG (1 SYRINGE) UNDER THE SKIN ONCE EVERY 4 WEEKS.    Ventolin HFA 90 mcg/actuation inhaler 2 puffs, inhalation, Every 4 hours PRN        Objective   Visit Vitals  /66   Pulse 56 Comment: low hr   Temp 36.3 °C (97.3 °F) (Temporal)          2/27/2024    11:00 AM 2/27/2024    11:30 AM 2/27/2024    11:45 AM 3/27/2024     4:18 PM 3/27/2024     5:49 PM 4/4/2024    10:07 AM 6/20/2024     8:43 AM   Vitals   Systolic 132 141 141 140  153 133   Diastolic 89 84 94 64  73 66   Heart Rate 60 60 58 80  66 56   Temp    38.6 °C (101.5 °F) 37.8 °C (100.1 °F) 36.9 °C (98.4 °F) 36.3 °C (97.3 °F)   Resp 18 18 16 18      Height (in)    1.651 m (5' 5\")  1.651 m (5' 5\") 1.651 m (5' 5\")   Weight (lb)    174.16  171.7 169.8   BMI    28.98 kg/m2  28.57 kg/m2 28.26 kg/m2   BSA (m2)    1.9 m2  1.89 m2 1.88 m2   Visit Report      Report Report     Physical Exam  Vitals reviewed.   Constitutional:       General: He is awake.      Appearance: Normal appearance.   HENT:      Head: Normocephalic and atraumatic.      Nose: Nose normal.      Mouth/Throat:      Mouth: Mucous membranes are moist.   Eyes:      Pupils: Pupils are equal, round, and reactive to light.   Cardiovascular:      Rate and Rhythm: Normal rate.   Pulmonary:      Effort: Pulmonary effort is normal.   Abdominal:      Comments: Liver edge hard   Skin:     Comments: Rash on upper arms  petechial   Neurological:      Mental Status: He " is alert and oriented to person, place, and time. Mental status is at baseline.   Psychiatric:         Attention and Perception: Attention and perception normal.         Mood and Affect: Mood normal.         Behavior: Behavior normal.     Labs    WBC   Date/Time Value Ref Range Status   02/19/2024 07:54 AM 8.4 4.4 - 11.3 x10*3/uL Final     Hemoglobin   Date/Time Value Ref Range Status   02/19/2024 07:54 AM 15.0 13.5 - 17.5 g/dL Final     Hematocrit   Date/Time Value Ref Range Status   02/19/2024 07:54 AM 45.5 41.0 - 52.0 % Final     MCV   Date/Time Value Ref Range Status   02/19/2024 07:54 AM 93 80 - 100 fL Final     Platelets   Date/Time Value Ref Range Status   02/19/2024 07:54  150 - 450 x10*3/uL Final        Total Protein   Date/Time Value Ref Range Status   02/19/2024 07:54 AM 7.9 6.4 - 8.2 g/dL Final     Albumin   Date/Time Value Ref Range Status   02/19/2024 07:54 AM 4.0 3.4 - 5.0 g/dL Final     AST   Date/Time Value Ref Range Status   02/19/2024 07:54 AM 68 (H) 9 - 39 U/L Final     ALT   Date/Time Value Ref Range Status   02/19/2024 07:54 AM 75 (H) 10 - 52 U/L Final     Comment:     Patients treated with Sulfasalazine may generate falsely decreased results for ALT.     Alkaline Phosphatase   Date/Time Value Ref Range Status   02/19/2024 07:54  (H) 33 - 120 U/L Final     Bilirubin, Total   Date/Time Value Ref Range Status   02/19/2024 07:54 AM 1.1 0.0 - 1.2 mg/dL Final     Bilirubin, Direct   Date/Time Value Ref Range Status   02/19/2024 07:54 AM 0.3 0.0 - 0.3 mg/dL Final        Vitamin D, 25-Hydroxy   Date/Time Value Ref Range Status   08/31/2021 02:57 PM 49 ng/mL Final     Comment:     .  DEFICIENCY:         < 20   NG/ML  INSUFFICIENCY:      20-29  NG/ML  SUFFICIENCY:         NG/ML    THIS ASSAY ACCURATELY QUANTIFIES THE SUM OF  VITAMIN D3, 25-HYDROXY AND VIT D2,25-HYDROXY.          AST   Date/Time Value Ref Range Status   02/19/2024 07:54 AM 68 (H) 9 - 39 U/L Final     ALT   Date/Time  Value Ref Range Status   02/19/2024 07:54 AM 75 (H) 10 - 52 U/L Final     Comment:     Patients treated with Sulfasalazine may generate falsely decreased results for ALT.     Alkaline Phosphatase   Date/Time Value Ref Range Status   02/19/2024 07:54  (H) 33 - 120 U/L Final     Bilirubin, Total   Date/Time Value Ref Range Status   02/19/2024 07:54 AM 1.1 0.0 - 1.2 mg/dL Final     Bilirubin, Direct   Date/Time Value Ref Range Status   02/19/2024 07:54 AM 0.3 0.0 - 0.3 mg/dL Final     Albumin   Date/Time Value Ref Range Status   02/19/2024 07:54 AM 4.0 3.4 - 5.0 g/dL Final     Total Protein   Date/Time Value Ref Range Status   02/19/2024 07:54 AM 7.9 6.4 - 8.2 g/dL Final        Protime   Date/Time Value Ref Range Status   02/19/2024 07:54 AM 12.1 9.8 - 12.8 seconds Final     INR   Date/Time Value Ref Range Status   02/19/2024 07:54 AM 1.1 0.9 - 1.1 Final       MRI     IMPRESSION:  1. Cirrhotic morphology of the liver without evidence of portal  hypertension. MR elastography demonstrates stage 3-4 fibrosis.  2. Hepatic segment VII hemangioma. No suspicious liver lesions.  3. 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    Pathology  Liver biopsy  FINAL DIAGNOSIS   A.  Liver, right, needle core biopsy:  Cirrhosis of liver with minimal activity; etiology not apparent on histology.  See microscopic description.     MICROSCOPIC DESCRIPTION:     The biopsy shows distortion of the hepatic architecture by nodules of regenerating hepatic parenchyma surrounded by fibrosis, consistent with cirrhosis, confirmed by the trichrome and reticulin stains.  The fibrous septa show minimal chronic inflammation composed of small mature appearing lymphocytes and rare plasma cells.  Interface activity is minimal.  There is focal perivenular inflammation. The native interlobular bile ducts appear intact without evidence of  injury.  Granulomas or cholestasis is not seen. Steatosis is not identified.  The iron stain shows trace storage iron.  PAS stain after diastase digestion does not highlight diagnostic cytoplasmic inclusions.     Cirrhosis represents the final sequelae of multiple hepatic insults. The differential diagnosis for the etiology is broad and includes hepatotrophic viral infections, drug/toxin mediated liver damage, common causes of steatohepatitis as well as autoimmune hepatitis. Note is made of the elevated serum IgG and alkaline phosphatase levels. Features of a primary biliary disorder are not identified in this specimen. End-stage of partially treated autoimmune hepatitis or drug/medication-related injury remains a possibility. Correlation of clinical and serologic findings will be helpful to determine the etiology for cirrhosis.     Assessment/Plan   Arturo Craft is a 54 y.o. male who presents to Liver clinic for for follow up for  chronic cholestatic liver disease/persistently abnormal ALK and cirrhosis.    Impression:  PSC remains a possibility  given longstanding Crohn's colitis despite non diagnostic MRI and Liver biopsy. I do note the elevated IGG and IGG4 levels. About 10% of PSC patients will have elevated IGG4. This could be a clue to IGG4 cholangiopathy as well. Otherwise, drug induced liver injury from Stelera (especially as dosing interval has shortened) is possible. Less likely AIH, PBC, sarcoidosis. As of now, cirrhosis is cryptogenic.     Will repeat MRI/MRCP for follow up assessment.   Prefers not do a MR Elastography, so will conduct follow up elastography with Fibroscan..     Has significant cholestatic pruritus.    Plan:  Diagnosis remains elusive. He is cirrhotic.  Will prescribe Cholestyramine for itching.  MRI MRCP surveillance.     SHAI Hendrickson MD    Instructions      Franky Hendrickson MD

## 2024-06-22 ENCOUNTER — PHARMACY VISIT (OUTPATIENT)
Dept: PHARMACY | Facility: CLINIC | Age: 54
End: 2024-06-22
Payer: MEDICAID

## 2024-06-24 ENCOUNTER — LAB (OUTPATIENT)
Dept: LAB | Facility: LAB | Age: 54
End: 2024-06-24
Payer: COMMERCIAL

## 2024-06-24 DIAGNOSIS — K50.113 CROHN'S DISEASE OF LARGE INTESTINE WITH FISTULA (MULTI): ICD-10-CM

## 2024-06-24 DIAGNOSIS — L29.8 CHOLESTATIC PRURITUS: ICD-10-CM

## 2024-06-24 DIAGNOSIS — R74.8 ELEVATED ALKALINE PHOSPHATASE LEVEL: ICD-10-CM

## 2024-06-24 DIAGNOSIS — K74.00 LIVER FIBROSIS: ICD-10-CM

## 2024-06-24 DIAGNOSIS — K83.1 CHOLESTATIC LIVER DISEASE (CMS-HCC): ICD-10-CM

## 2024-06-24 LAB
ALBUMIN SERPL BCP-MCNC: 3.9 G/DL (ref 3.4–5)
ALP SERPL-CCNC: 339 U/L (ref 33–120)
ALT SERPL W P-5'-P-CCNC: 52 U/L (ref 10–52)
ANION GAP SERPL CALC-SCNC: 12 MMOL/L (ref 10–20)
AST SERPL W P-5'-P-CCNC: 48 U/L (ref 9–39)
BASOPHILS # BLD AUTO: 0.06 X10*3/UL (ref 0–0.1)
BASOPHILS NFR BLD AUTO: 0.7 %
BILIRUB DIRECT SERPL-MCNC: 0.4 MG/DL (ref 0–0.3)
BILIRUB SERPL-MCNC: 0.9 MG/DL (ref 0–1.2)
BUN SERPL-MCNC: 13 MG/DL (ref 6–23)
CALCIUM SERPL-MCNC: 9.3 MG/DL (ref 8.6–10.6)
CHLORIDE SERPL-SCNC: 105 MMOL/L (ref 98–107)
CO2 SERPL-SCNC: 24 MMOL/L (ref 21–32)
CREAT SERPL-MCNC: 1.03 MG/DL (ref 0.5–1.3)
EGFRCR SERPLBLD CKD-EPI 2021: 86 ML/MIN/1.73M*2
EOSINOPHIL # BLD AUTO: 0.12 X10*3/UL (ref 0–0.7)
EOSINOPHIL NFR BLD AUTO: 1.5 %
ERYTHROCYTE [DISTWIDTH] IN BLOOD BY AUTOMATED COUNT: 15 % (ref 11.5–14.5)
GLUCOSE SERPL-MCNC: 81 MG/DL (ref 74–99)
HCT VFR BLD AUTO: 46.4 % (ref 41–52)
HGB BLD-MCNC: 15.5 G/DL (ref 13.5–17.5)
IGG SERPL-MCNC: 2340 MG/DL (ref 700–1600)
IGG1 SER-MCNC: 1440 MG/DL (ref 490–1140)
IGG2 SER-MCNC: 847 MG/DL (ref 150–640)
IGG3 SER-MCNC: 34 MG/DL (ref 11–85)
IGG4 SER-MCNC: 416 MG/DL (ref 3–200)
IMM GRANULOCYTES # BLD AUTO: 0.03 X10*3/UL (ref 0–0.7)
IMM GRANULOCYTES NFR BLD AUTO: 0.4 % (ref 0–0.9)
INR PPP: 1 (ref 0.9–1.1)
LYMPHOCYTES # BLD AUTO: 2.19 X10*3/UL (ref 1.2–4.8)
LYMPHOCYTES NFR BLD AUTO: 26.8 %
MCH RBC QN AUTO: 31.8 PG (ref 26–34)
MCHC RBC AUTO-ENTMCNC: 33.4 G/DL (ref 32–36)
MCV RBC AUTO: 95 FL (ref 80–100)
MONOCYTES # BLD AUTO: 0.78 X10*3/UL (ref 0.1–1)
MONOCYTES NFR BLD AUTO: 9.5 %
NEUTROPHILS # BLD AUTO: 4.99 X10*3/UL (ref 1.2–7.7)
NEUTROPHILS NFR BLD AUTO: 61.1 %
NRBC BLD-RTO: 0 /100 WBCS (ref 0–0)
PLATELET # BLD AUTO: 236 X10*3/UL (ref 150–450)
POTASSIUM SERPL-SCNC: 4.4 MMOL/L (ref 3.5–5.3)
PROT SERPL-MCNC: 8.2 G/DL (ref 6.4–8.2)
PROTHROMBIN TIME: 11.5 SECONDS (ref 9.8–12.8)
RBC # BLD AUTO: 4.87 X10*6/UL (ref 4.5–5.9)
SODIUM SERPL-SCNC: 137 MMOL/L (ref 136–145)
WBC # BLD AUTO: 8.2 X10*3/UL (ref 4.4–11.3)

## 2024-06-24 PROCEDURE — 36415 COLL VENOUS BLD VENIPUNCTURE: CPT

## 2024-06-24 PROCEDURE — 80053 COMPREHEN METABOLIC PANEL: CPT

## 2024-06-24 PROCEDURE — 82784 ASSAY IGA/IGD/IGG/IGM EACH: CPT

## 2024-06-24 PROCEDURE — 82248 BILIRUBIN DIRECT: CPT

## 2024-06-24 PROCEDURE — 85025 COMPLETE CBC W/AUTO DIFF WBC: CPT

## 2024-06-24 PROCEDURE — 85610 PROTHROMBIN TIME: CPT

## 2024-06-28 ENCOUNTER — TELEPHONE (OUTPATIENT)
Dept: GASTROENTEROLOGY | Facility: CLINIC | Age: 54
End: 2024-06-28
Payer: COMMERCIAL

## 2024-06-28 NOTE — TELEPHONE ENCOUNTER
"Hepatology Nurse Coordinator Note   Patient called and left a voicemail that his \"medication is causing complications.\" He complains of \"bloating and gas.\" He did not state the name of the medication. Called for more info. No answer. Unable to leave a message. Mailbox not set up.   "

## 2024-07-03 DIAGNOSIS — L29.8 CHOLESTATIC PRURITUS: Primary | ICD-10-CM

## 2024-07-03 RX ORDER — HYDROXYZINE HYDROCHLORIDE 25 MG/1
25 TABLET, FILM COATED ORAL EVERY 8 HOURS PRN
Qty: 60 TABLET | Refills: 1 | Status: SHIPPED | OUTPATIENT
Start: 2024-07-03 | End: 2024-09-01

## 2024-07-03 NOTE — TELEPHONE ENCOUNTER
Hepatology Nurse Coordinator Note  Attempted to reach patient again. No answer. Unable to leave message. Voicemail box not set up. Will update Dr. Hendrickson.

## 2024-07-03 NOTE — TELEPHONE ENCOUNTER
"Hepatology Nurse Coordinator Note   Spoke with patient. He states he has been having \"gas and bloating\" with his cholestyramine. He states the itching is a little better, but still there. He's aware Dr. Hendrickson would like him to stop the Cholestyramine and that he will start him on hydroxyzine. He's aware Dr. Hendrickson sent the prescription to his pharmacy. He verbalized understanding.   "

## 2024-07-05 PROCEDURE — RXMED WILLOW AMBULATORY MEDICATION CHARGE

## 2024-07-18 ENCOUNTER — SPECIALTY PHARMACY (OUTPATIENT)
Dept: PHARMACY | Facility: CLINIC | Age: 54
End: 2024-07-18

## 2024-07-18 PROCEDURE — RXMED WILLOW AMBULATORY MEDICATION CHARGE

## 2024-07-19 ENCOUNTER — PHARMACY VISIT (OUTPATIENT)
Dept: PHARMACY | Facility: CLINIC | Age: 54
End: 2024-07-19
Payer: MEDICAID

## 2024-07-22 ENCOUNTER — SPECIALTY PHARMACY (OUTPATIENT)
Dept: PHARMACY | Facility: CLINIC | Age: 54
End: 2024-07-22

## 2024-07-22 ENCOUNTER — PHARMACY VISIT (OUTPATIENT)
Dept: PHARMACY | Facility: CLINIC | Age: 54
End: 2024-07-22
Payer: MEDICAID

## 2024-08-01 ENCOUNTER — APPOINTMENT (OUTPATIENT)
Dept: RADIOLOGY | Facility: CLINIC | Age: 54
End: 2024-08-01
Payer: COMMERCIAL

## 2024-08-01 ENCOUNTER — HOSPITAL ENCOUNTER (OUTPATIENT)
Dept: RADIOLOGY | Facility: CLINIC | Age: 54
Discharge: HOME | End: 2024-08-01
Payer: COMMERCIAL

## 2024-08-02 ENCOUNTER — HOSPITAL ENCOUNTER (OUTPATIENT)
Dept: RADIOLOGY | Facility: CLINIC | Age: 54
Discharge: HOME | End: 2024-08-02
Payer: COMMERCIAL

## 2024-08-02 DIAGNOSIS — K50.113 CROHN'S DISEASE OF LARGE INTESTINE WITH FISTULA (MULTI): ICD-10-CM

## 2024-08-02 DIAGNOSIS — K83.1 CHOLESTATIC LIVER DISEASE (CMS-HCC): ICD-10-CM

## 2024-08-02 DIAGNOSIS — L29.8 CHOLESTATIC PRURITUS: ICD-10-CM

## 2024-08-02 DIAGNOSIS — R74.8 ELEVATED ALKALINE PHOSPHATASE LEVEL: ICD-10-CM

## 2024-08-02 PROCEDURE — 74181 MRI ABDOMEN W/O CONTRAST: CPT

## 2024-08-08 ENCOUNTER — TELEPHONE (OUTPATIENT)
Dept: GASTROENTEROLOGY | Facility: HOSPITAL | Age: 54
End: 2024-08-08
Payer: COMMERCIAL

## 2024-08-08 NOTE — TELEPHONE ENCOUNTER
Hepatology Nurse Coordinator Note  Returned call to patient. Patient wanted to coordinate his annual visit with Dr. Hendrickson and scheduling of his Fibroscan. No fibroscan appointments available on Thursdays, when Dr. Hendrickson has clinic. Patient is aware I will touch base with [facility] site to see if it can get coordinated, and would follow back up later next week (approximately). Patient verbalized understanding and had no further questions, or concerns at this time.

## 2024-08-14 ENCOUNTER — SPECIALTY PHARMACY (OUTPATIENT)
Dept: PHARMACY | Facility: CLINIC | Age: 54
End: 2024-08-14

## 2024-08-14 PROCEDURE — RXMED WILLOW AMBULATORY MEDICATION CHARGE

## 2024-08-21 ENCOUNTER — PHARMACY VISIT (OUTPATIENT)
Dept: PHARMACY | Facility: CLINIC | Age: 54
End: 2024-08-21
Payer: MEDICAID

## 2024-09-11 DIAGNOSIS — K50.113 CROHN'S DISEASE OF LARGE INTESTINE WITH FISTULA (MULTI): ICD-10-CM

## 2024-09-11 RX ORDER — USTEKINUMAB 90 MG/ML
INJECTION, SOLUTION SUBCUTANEOUS
Qty: 1 ML | Refills: 5 | Status: SHIPPED | OUTPATIENT
Start: 2024-09-11 | End: 2025-09-11

## 2024-09-12 PROCEDURE — RXMED WILLOW AMBULATORY MEDICATION CHARGE

## 2024-09-13 ENCOUNTER — SPECIALTY PHARMACY (OUTPATIENT)
Dept: PHARMACY | Facility: CLINIC | Age: 54
End: 2024-09-13

## 2024-09-14 ENCOUNTER — PHARMACY VISIT (OUTPATIENT)
Dept: PHARMACY | Facility: CLINIC | Age: 54
End: 2024-09-14
Payer: MEDICAID

## 2024-09-18 DIAGNOSIS — L29.8 CHOLESTATIC PRURITUS: ICD-10-CM

## 2024-09-18 RX ORDER — HYDROXYZINE HYDROCHLORIDE 25 MG/1
25 TABLET, FILM COATED ORAL EVERY 8 HOURS PRN
Qty: 60 TABLET | Refills: 1 | Status: CANCELLED | OUTPATIENT
Start: 2024-09-18 | End: 2024-11-17

## 2024-09-23 ENCOUNTER — SPECIALTY PHARMACY (OUTPATIENT)
Dept: PHARMACY | Facility: CLINIC | Age: 54
End: 2024-09-23

## 2024-09-23 DIAGNOSIS — L29.8 CHOLESTATIC PRURITUS: ICD-10-CM

## 2024-09-23 RX ORDER — HYDROXYZINE HYDROCHLORIDE 25 MG/1
25 TABLET, FILM COATED ORAL EVERY 8 HOURS PRN
Qty: 60 TABLET | Refills: 1 | Status: SHIPPED | OUTPATIENT
Start: 2024-09-23 | End: 2024-11-22

## 2024-09-24 PROCEDURE — RXMED WILLOW AMBULATORY MEDICATION CHARGE

## 2024-09-30 ENCOUNTER — SPECIALTY PHARMACY (OUTPATIENT)
Dept: PHARMACY | Facility: CLINIC | Age: 54
End: 2024-09-30

## 2024-09-30 ENCOUNTER — PHARMACY VISIT (OUTPATIENT)
Dept: PHARMACY | Facility: CLINIC | Age: 54
End: 2024-09-30
Payer: MEDICAID

## 2024-10-01 ENCOUNTER — TELEPHONE (OUTPATIENT)
Dept: GASTROENTEROLOGY | Facility: CLINIC | Age: 54
End: 2024-10-01
Payer: COMMERCIAL

## 2024-10-01 NOTE — TELEPHONE ENCOUNTER
Hepatology Nurse Coordinator Note - ADDENDUM  10/1/2024 12:21 PM  Called patient to let him know a visit for a fibroscan and follow up with Dr. Hendrickson was scheduled/coordinated as requested as follows:  5/29/2025  9:30 am FIBROSCAN  5/29/2025  10:00 am Dr. Hendrickson visit.    Patient verbalized understanding. Patient is requesting for scheduling reminder to be mailed to him. Confirmed mailing address. Sent.     ----- Message from Nurse Viridiana GONZALES sent at 8/8/2024  3:45 PM EDT -----  Regarding: Talking with [facility]/Fibroscan Nurses  Patient would like visit coordinated with Fibroscan for next year (see recs). See if it can be coordinated on a Thursday. Follow back up with patient.

## 2024-10-09 PROCEDURE — RXMED WILLOW AMBULATORY MEDICATION CHARGE

## 2024-10-10 ENCOUNTER — OFFICE VISIT (OUTPATIENT)
Dept: GASTROENTEROLOGY | Facility: CLINIC | Age: 54
End: 2024-10-10
Payer: COMMERCIAL

## 2024-10-10 ENCOUNTER — PHARMACY VISIT (OUTPATIENT)
Dept: PHARMACY | Facility: CLINIC | Age: 54
End: 2024-10-10
Payer: MEDICAID

## 2024-10-10 ENCOUNTER — LAB (OUTPATIENT)
Dept: LAB | Facility: LAB | Age: 54
End: 2024-10-10
Payer: COMMERCIAL

## 2024-10-10 VITALS
BODY MASS INDEX: 28.32 KG/M2 | HEART RATE: 62 BPM | WEIGHT: 170 LBS | TEMPERATURE: 98.4 F | DIASTOLIC BLOOD PRESSURE: 72 MMHG | HEIGHT: 65 IN | SYSTOLIC BLOOD PRESSURE: 148 MMHG

## 2024-10-10 DIAGNOSIS — K83.1 CHOLESTATIC LIVER DISEASE (CMS-HCC): ICD-10-CM

## 2024-10-10 DIAGNOSIS — K50.113 CROHN'S DISEASE OF LARGE INTESTINE WITH FISTULA (MULTI): ICD-10-CM

## 2024-10-10 DIAGNOSIS — K50.113 CROHN'S DISEASE OF LARGE INTESTINE WITH FISTULA (MULTI): Primary | ICD-10-CM

## 2024-10-10 LAB
ALBUMIN SERPL BCP-MCNC: 4 G/DL (ref 3.4–5)
ALP SERPL-CCNC: 281 U/L (ref 33–120)
ALT SERPL W P-5'-P-CCNC: 57 U/L (ref 10–52)
AST SERPL W P-5'-P-CCNC: 51 U/L (ref 9–39)
BILIRUB DIRECT SERPL-MCNC: 0.3 MG/DL (ref 0–0.3)
BILIRUB SERPL-MCNC: 1 MG/DL (ref 0–1.2)
PROT SERPL-MCNC: 8.4 G/DL (ref 6.4–8.2)

## 2024-10-10 PROCEDURE — 36415 COLL VENOUS BLD VENIPUNCTURE: CPT

## 2024-10-10 PROCEDURE — 80299 QUANTITATIVE ASSAY DRUG: CPT

## 2024-10-10 PROCEDURE — 82542 COL CHROMOTOGRAPHY QUAL/QUAN: CPT

## 2024-10-10 PROCEDURE — 80076 HEPATIC FUNCTION PANEL: CPT

## 2024-10-10 PROCEDURE — 99213 OFFICE O/P EST LOW 20 MIN: CPT | Performed by: INTERNAL MEDICINE

## 2024-10-10 PROCEDURE — 3008F BODY MASS INDEX DOCD: CPT | Performed by: INTERNAL MEDICINE

## 2024-10-10 PROCEDURE — 86235 NUCLEAR ANTIGEN ANTIBODY: CPT

## 2024-10-10 PROCEDURE — 86038 ANTINUCLEAR ANTIBODIES: CPT

## 2024-10-10 ASSESSMENT — PAIN SCALES - GENERAL: PAINLEVEL: 0-NO PAIN

## 2024-10-10 NOTE — PROGRESS NOTES
"REASON FOR VISIT:  Crohn's disease    HPI:  Arturo Craft is a 54 y.o. male who presents for follow-up of Crohn's colitis and perianal Crohn's disease. Initially diagnosed as UC in 1990's.  Later developed perianal disease -->Crohn's disease.  Treated with 5-ASA and steroids on and off for years.  (+) HS with drainage intermittently in underarms    Also (+) mild persistent LFT abnormalities; USG of liver with Doppler unrevealing.  ?correlate with change to every 4 week Stelara? Saw Dr. Hendrickson.  MRI w/ elastography suggests cirrhosis w/o portal HTN.  2/2024 liver biopsy shows cirrhosis of liver with minimal activity; etiology not apparent on histology. Unclear if  this is from PSC vs. Drug vs other.       Started Humira in 3/2019; did OK but developed a treatment-related palmar-plantar psoriasis. 9/2019 changed to Stelara. 3/2020 FCP 34 (normal) and Stelara level 2.7 on every 8 week dosing; changed Q6 week dosing 6/2020.  12/2020 f/u [facility]  on Stelara every 6 weeks = 4.8 with no antibodies.   FCP 52  CRP 1.42     6/2021 Stellara frequency from Q6 to Q4 for worsening/persistent perianal disease; since dose increased, has overall been doing well.    6/2023 colonoscopy OK; path all without any active or chronic inflammation of dysplsia.  Repeat in 3 years.       Last seen 4/2024 and doing OK on every 4 week Stelara.  Moving bowels 3-4 daily.  No setons and no fistula problems.  No oral ulcers.  No joint inflammation, rash or eye inflammation.In follow-up today, off gabapentin and now on methacarbamol for hip pain.  He is feeling OK on every 4 week Stelara.  BMs most days 3-4 and formed stool.  No fistula issues; no setons.  Has noted \"blotches\" on arms, back, and trunk.  Some itching, worse at night.      In follow-up today, Dr. Hendrickson gave him cholestyramine for itching.  He did see Dermatology.  Skin biopsy with telangiectasia with perivascular lymphocytic infiltrate.  He did not tolerate cholestyramine.  Felt " bloated and gassy.  Stopped.  Now on hydroxyzine 25 mg BID and this is helping the itching.      Bowels are doing well. Stool formed 3-4 daily.  No blood.  No fistula activity on every 4 week Stelara.  Tolerating Stelara OK.  Has not missed any doses.  Day he gives the shot feels fatigued, then feels better after 2-3 days.  Next dose due in 2 days.    Feels tired a lot.  Continues to smoke.  No dyne aphasia or dysphagia.  No IBD extraintestinal manifestations.    REVIEW OF SYSTEMS  Complete review of systems otherwise negative per complaint    Allergies   Allergen Reactions    Budesonide Anaphylaxis     fever    Adalimumab Unknown    Aspirin Unknown    Entercote Unknown    Iodinated Contrast Media GI Upset    Milk Unknown    Salicylates GI Upset    Salicylic Acid GI Upset    Sulfamethoxazole Unknown    Tomato Other       Past Medical History:   Diagnosis Date    Asthma ([facility])     Chronic iridocyclitis, unspecified eye 10/30/2013    Chronic iritis    Crohn's disease of large intestine with fistula ([facility]) 07/07/2022    Crohn's disease of large intestine with fistula    Enterocolitis due to Clostridium difficile, not specified as recurrent 03/05/2019    C. difficile colitis    Other conditions influencing health status 10/30/2013    Endothelial Corneal Dystrophy    Personal history of other diseases of the respiratory system     Personal history of asthma       Past Surgical History:   Procedure Laterality Date    OTHER SURGICAL HISTORY  10/08/2021    Anal seton placement    OTHER SURGICAL HISTORY  08/26/2014    Brain Surgery       Current Outpatient Medications   Medication Sig Dispense Refill    albuterol 2.5 mg /3 mL (0.083 %) nebulizer solution USE 3 ML VIA NEBULIZER EVERY 4 HOURS AS NEEDED FOR WHEEZING/SHORTNESS OF BREATH.      clobetasoL 0.05 % shampoo PLEASE SEE ATTACHED FOR DETAILED DIRECTIONS      dorzolamide-timoloL (Cosopt) 22.3-6.8 mg/mL ophthalmic solution USE 1 DROP IN BOTH EYES EVERY 12 HOURS.       "Dulera 50-5 mcg/actuation HFA aerosol inhaler inhaler INHALE 2 PUFFS AS INSTRUCTED TWICE DAILY.      fluocinonide 0.05 % cream 1 Application      fluticasone (Flonase) 50 mcg/actuation nasal spray Administer into affected nostril(s) once daily.      hydrOXYzine HCL (Atarax) 25 mg tablet Take 1 tablet (25 mg) by mouth every 8 hours if needed for itching. 60 tablet 1    lidocaine (Xylocaine) 5 % ointment APPLY TO AFFECTED AREAS AS DIRECTED.      loratadine (Claritin) 10 mg tablet Take 1 tablet (10 mg) by mouth once daily as needed.      methocarbamol (Robaxin) 500 mg tablet Take 1 tablet (500 mg) by mouth 3 times a day.      sildenafil (Viagra) 25 mg tablet TAKE 4 TABLETS BY MOUTH AS NEEDED 30-60 MINUTES BEFORE SEXUAL INTERCOURSE      Spiriva Respimat 2.5 mcg/actuation inhaler Inhale 2 puffs once daily.      ustekinumab (Stelara) injection INJECT 90MG (1 SYRINGE) UNDER THE SKIN ONCE EVERY 4 WEEKS. 1 mL 5    Ventolin HFA 90 mcg/actuation inhaler Inhale 2 puffs every 4 hours if needed.       No current facility-administered medications for this visit.       PHYSICAL EXAM:  /72   Pulse 62   Temp 36.9 °C (98.4 °F)   Ht 1.651 m (5' 5\")   Wt 77.1 kg (170 lb)   BMI 28.29 kg/m²   Vital signs reviewed  Patient alert and oriented in no acute distress  Anicteric  No cervical adenopathy  Cardiac exam regular rate and rhythm S1-S2 without murmurs gallops or rubs  Lungs clear to auscultation bilaterally  Abdomen soft and nontender; average at right costal margin.  Spleen tip palpable with inspiration..  No rebound or guarding.  Bowel sounds present  Extremities without edema          Lab Results   Component Value Date    WBC 8.2 06/24/2024    HGB 15.5 06/24/2024    HCT 46.4 06/24/2024    MCV 95 06/24/2024     06/24/2024     Lab Results   Component Value Date    ALT 52 06/24/2024    AST 48 (H) 06/24/2024    ALKPHOS 339 (H) 06/24/2024    BILITOT 0.9 06/24/2024     ASSESSMENT  #Crohn's disease-continues to do well on " Stelara therapy every 4 weeks without any disease related symptoms.  I again encouraged him to stop smoking.  We will go ahead and check labs including a Stelara level.  If this is not adequate, since he is doing so well, we may back him down to every 6-week Stelara injections and see how he does.    With his fatigue and the residual question of whether he could have some immune reaction to the Stelara and whether it could be playing a role in his cirrhosis, we will check an CABRERA screen again as well as a histone antibody.    # Cirrhosis-currently stable.  Will check CABRERA screen and histone antibody to try to make sure that Stelara is not contributing to liver disease.    # Rash with itching-etiology is uncertain, but could be related to liver disease.  This is better with hydroxyzine.    #Elevated blood pressure-systolic blood pressure slightly high today.  He will follow with PCP for this.    PLAN  1) check labs today including a Stelara level  2) for now, continue Stelara every 4 weeks.  If the Stelara level is good, then we will discuss lowering Stelara to every 6-week injections  3) as long as you are feeling well, follow-up in the office in 6 months and call with any symptom change

## 2024-10-10 NOTE — PATIENT INSTRUCTIONS
Thanks for coming in for follow-up today.  It is great that your Crohn's disease remains under control.  As we reviewed:    PLAN  1) check labs today including a Stelara level  2) for now, continue Stelara every 4 weeks.  If the Stelara level is good, then we will discuss lowering Stelara to every 6-week injections  3) as long as you are feeling well, follow-up in the office in 6 months and call with any symptom change

## 2024-10-11 LAB — ANA SER QL HEP2 SUBST: NEGATIVE

## 2024-10-14 LAB
SCAN RESULT: NORMAL
USTEKINUMAB AB SERPL IA-MCNC: <1.6 U/ML
USTEKINUMAB SERPL IA-MCNC: 9.9 UG/ML

## 2024-10-15 DIAGNOSIS — K50.113 CROHN'S DISEASE OF LARGE INTESTINE WITH FISTULA (MULTI): Primary | ICD-10-CM

## 2024-10-15 LAB — HISTONE IGG SER IA-ACNC: 0.5 UNITS (ref 0–0.9)

## 2024-10-17 RX ORDER — USTEKINUMAB 90 MG/ML
90 INJECTION, SOLUTION SUBCUTANEOUS SEE ADMIN INSTRUCTIONS
Qty: 1 ML | Refills: 7 | Status: SHIPPED | OUTPATIENT
Start: 2024-10-17 | End: 2025-10-17

## 2024-10-27 PROCEDURE — RXMED WILLOW AMBULATORY MEDICATION CHARGE

## 2024-11-04 PROCEDURE — RXMED WILLOW AMBULATORY MEDICATION CHARGE

## 2024-11-05 ENCOUNTER — SPECIALTY PHARMACY (OUTPATIENT)
Dept: PHARMACY | Facility: CLINIC | Age: 54
End: 2024-11-05

## 2024-11-06 ENCOUNTER — PHARMACY VISIT (OUTPATIENT)
Dept: PHARMACY | Facility: CLINIC | Age: 54
End: 2024-11-06
Payer: MEDICAID

## 2024-11-14 DIAGNOSIS — L29.81 CHOLESTATIC PRURITUS: ICD-10-CM

## 2024-11-14 RX ORDER — HYDROXYZINE HYDROCHLORIDE 25 MG/1
25 TABLET, FILM COATED ORAL EVERY 8 HOURS PRN
Qty: 60 TABLET | Refills: 1 | Status: CANCELLED | OUTPATIENT
Start: 2024-11-14 | End: 2025-01-13

## 2024-11-18 ENCOUNTER — SPECIALTY PHARMACY (OUTPATIENT)
Dept: PHARMACY | Facility: CLINIC | Age: 54
End: 2024-11-18

## 2024-11-18 DIAGNOSIS — L29.81 CHOLESTATIC PRURITUS: ICD-10-CM

## 2024-11-21 RX ORDER — HYDROXYZINE HYDROCHLORIDE 25 MG/1
25 TABLET, FILM COATED ORAL EVERY 8 HOURS PRN
Qty: 60 TABLET | Refills: 1 | Status: SHIPPED | OUTPATIENT
Start: 2024-11-21 | End: 2025-01-20

## 2024-11-22 PROCEDURE — RXMED WILLOW AMBULATORY MEDICATION CHARGE

## 2024-12-03 ENCOUNTER — SPECIALTY PHARMACY (OUTPATIENT)
Dept: PHARMACY | Facility: CLINIC | Age: 54
End: 2024-12-03

## 2024-12-05 ENCOUNTER — PHARMACY VISIT (OUTPATIENT)
Dept: PHARMACY | Facility: CLINIC | Age: 54
End: 2024-12-05
Payer: MEDICAID

## 2024-12-11 PROCEDURE — RXMED WILLOW AMBULATORY MEDICATION CHARGE

## 2024-12-14 ENCOUNTER — PHARMACY VISIT (OUTPATIENT)
Dept: PHARMACY | Facility: CLINIC | Age: 54
End: 2024-12-14
Payer: MEDICAID

## 2025-01-06 PROCEDURE — RXMED WILLOW AMBULATORY MEDICATION CHARGE

## 2025-01-15 ENCOUNTER — SPECIALTY PHARMACY (OUTPATIENT)
Dept: PHARMACY | Facility: CLINIC | Age: 55
End: 2025-01-15

## 2025-01-16 ENCOUNTER — PHARMACY VISIT (OUTPATIENT)
Dept: PHARMACY | Facility: CLINIC | Age: 55
End: 2025-01-16
Payer: MEDICAID

## 2025-01-22 ENCOUNTER — PHARMACY VISIT (OUTPATIENT)
Dept: PHARMACY | Facility: CLINIC | Age: 55
End: 2025-01-22
Payer: MEDICAID

## 2025-01-22 PROCEDURE — RXMED WILLOW AMBULATORY MEDICATION CHARGE

## 2025-02-14 ENCOUNTER — SPECIALTY PHARMACY (OUTPATIENT)
Dept: PHARMACY | Facility: CLINIC | Age: 55
End: 2025-02-14

## 2025-02-14 DIAGNOSIS — L29.81 CHOLESTATIC PRURITUS: ICD-10-CM

## 2025-02-17 PROCEDURE — RXMED WILLOW AMBULATORY MEDICATION CHARGE

## 2025-02-17 RX ORDER — HYDROXYZINE HYDROCHLORIDE 25 MG/1
25 TABLET, FILM COATED ORAL EVERY 8 HOURS PRN
Qty: 60 TABLET | Refills: 1 | Status: SHIPPED | OUTPATIENT
Start: 2025-02-17 | End: 2025-04-18

## 2025-02-18 ENCOUNTER — SPECIALTY PHARMACY (OUTPATIENT)
Dept: PHARMACY | Facility: CLINIC | Age: 55
End: 2025-02-18

## 2025-02-19 ENCOUNTER — PHARMACY VISIT (OUTPATIENT)
Dept: PHARMACY | Facility: CLINIC | Age: 55
End: 2025-02-19
Payer: MEDICAID

## 2025-03-03 DIAGNOSIS — K50.113 CROHN'S DISEASE OF LARGE INTESTINE WITH FISTULA (MULTI): ICD-10-CM

## 2025-03-03 RX ORDER — USTEKINUMAB 90 MG/ML
90 INJECTION, SOLUTION SUBCUTANEOUS SEE ADMIN INSTRUCTIONS
Qty: 1 ML | Refills: 7 | Status: SHIPPED | OUTPATIENT
Start: 2025-03-03 | End: 2026-03-03

## 2025-03-04 PROCEDURE — RXMED WILLOW AMBULATORY MEDICATION CHARGE

## 2025-03-06 ENCOUNTER — SPECIALTY PHARMACY (OUTPATIENT)
Dept: PHARMACY | Facility: CLINIC | Age: 55
End: 2025-03-06

## 2025-03-06 PROCEDURE — RXMED WILLOW AMBULATORY MEDICATION CHARGE

## 2025-03-10 ENCOUNTER — PHARMACY VISIT (OUTPATIENT)
Dept: PHARMACY | Facility: CLINIC | Age: 55
End: 2025-03-10
Payer: MEDICAID

## 2025-03-25 ENCOUNTER — SPECIALTY PHARMACY (OUTPATIENT)
Dept: PHARMACY | Facility: CLINIC | Age: 55
End: 2025-03-25

## 2025-03-25 DIAGNOSIS — L29.81 CHOLESTATIC PRURITUS: ICD-10-CM

## 2025-03-26 PROCEDURE — RXMED WILLOW AMBULATORY MEDICATION CHARGE

## 2025-03-26 RX ORDER — HYDROXYZINE HYDROCHLORIDE 25 MG/1
25 TABLET, FILM COATED ORAL EVERY 8 HOURS PRN
Qty: 60 TABLET | Refills: 1 | Status: SHIPPED | OUTPATIENT
Start: 2025-03-26 | End: 2025-05-25

## 2025-04-01 ENCOUNTER — SPECIALTY PHARMACY (OUTPATIENT)
Dept: PHARMACY | Facility: CLINIC | Age: 55
End: 2025-04-01

## 2025-04-02 ENCOUNTER — PHARMACY VISIT (OUTPATIENT)
Dept: PHARMACY | Facility: CLINIC | Age: 55
End: 2025-04-02
Payer: MEDICAID

## 2025-04-14 ENCOUNTER — SPECIALTY PHARMACY (OUTPATIENT)
Dept: PHARMACY | Facility: CLINIC | Age: 55
End: 2025-04-14

## 2025-04-14 PROCEDURE — RXMED WILLOW AMBULATORY MEDICATION CHARGE

## 2025-04-15 ENCOUNTER — PHARMACY VISIT (OUTPATIENT)
Dept: PHARMACY | Facility: CLINIC | Age: 55
End: 2025-04-15
Payer: MEDICAID

## 2025-04-16 ENCOUNTER — PHARMACY VISIT (OUTPATIENT)
Dept: PHARMACY | Facility: CLINIC | Age: 55
End: 2025-04-16

## 2025-04-28 ENCOUNTER — SPECIALTY PHARMACY (OUTPATIENT)
Dept: PHARMACY | Facility: CLINIC | Age: 55
End: 2025-04-28

## 2025-05-05 ENCOUNTER — SPECIALTY PHARMACY (OUTPATIENT)
Dept: PHARMACY | Facility: CLINIC | Age: 55
End: 2025-05-05

## 2025-05-05 DIAGNOSIS — L29.81 CHOLESTATIC PRURITUS: ICD-10-CM

## 2025-05-05 PROCEDURE — RXMED WILLOW AMBULATORY MEDICATION CHARGE

## 2025-05-05 RX ORDER — HYDROXYZINE HYDROCHLORIDE 25 MG/1
25 TABLET, FILM COATED ORAL EVERY 8 HOURS PRN
Qty: 60 TABLET | Refills: 1 | Status: SHIPPED | OUTPATIENT
Start: 2025-05-05 | End: 2025-07-04

## 2025-05-06 ENCOUNTER — PHARMACY VISIT (OUTPATIENT)
Dept: PHARMACY | Facility: CLINIC | Age: 55
End: 2025-05-06
Payer: MEDICAID

## 2025-05-19 ENCOUNTER — SPECIALTY PHARMACY (OUTPATIENT)
Dept: PHARMACY | Facility: CLINIC | Age: 55
End: 2025-05-19

## 2025-05-22 ENCOUNTER — SPECIALTY PHARMACY (OUTPATIENT)
Dept: PHARMACY | Facility: CLINIC | Age: 55
End: 2025-05-22

## 2025-05-22 PROCEDURE — RXMED WILLOW AMBULATORY MEDICATION CHARGE

## 2025-05-23 ENCOUNTER — PHARMACY VISIT (OUTPATIENT)
Dept: PHARMACY | Facility: CLINIC | Age: 55
End: 2025-05-23
Payer: MEDICAID

## 2025-05-29 ENCOUNTER — TELEPHONE (OUTPATIENT)
Dept: GASTROENTEROLOGY | Facility: CLINIC | Age: 55
End: 2025-05-29

## 2025-05-29 ENCOUNTER — OFFICE VISIT (OUTPATIENT)
Dept: GASTROENTEROLOGY | Facility: CLINIC | Age: 55
End: 2025-05-29
Payer: COMMERCIAL

## 2025-05-29 ENCOUNTER — CLINICAL SUPPORT (OUTPATIENT)
Dept: GASTROENTEROLOGY | Facility: CLINIC | Age: 55
End: 2025-05-29
Payer: COMMERCIAL

## 2025-05-29 VITALS
TEMPERATURE: 98.4 F | DIASTOLIC BLOOD PRESSURE: 69 MMHG | SYSTOLIC BLOOD PRESSURE: 157 MMHG | HEIGHT: 65 IN | HEART RATE: 51 BPM | WEIGHT: 161 LBS | BODY MASS INDEX: 26.82 KG/M2

## 2025-05-29 DIAGNOSIS — L29.81 CHOLESTATIC PRURITUS: ICD-10-CM

## 2025-05-29 DIAGNOSIS — K83.1 CHOLESTATIC LIVER DISEASE: ICD-10-CM

## 2025-05-29 DIAGNOSIS — K74.69 CIRRHOSIS, CRYPTOGENIC (MULTI): Primary | ICD-10-CM

## 2025-05-29 DIAGNOSIS — R74.8 ELEVATED ALKALINE PHOSPHATASE LEVEL: ICD-10-CM

## 2025-05-29 DIAGNOSIS — K50.113 CROHN'S DISEASE OF LARGE INTESTINE WITH FISTULA (MULTI): ICD-10-CM

## 2025-05-29 PROCEDURE — 99214 OFFICE O/P EST MOD 30 MIN: CPT | Performed by: INTERNAL MEDICINE

## 2025-05-29 PROCEDURE — 3008F BODY MASS INDEX DOCD: CPT | Performed by: INTERNAL MEDICINE

## 2025-05-29 RX ORDER — TIOTROPIUM BROMIDE INHALATION SPRAY 3.12 UG/1
2 SPRAY, METERED RESPIRATORY (INHALATION)
COMMUNITY
Start: 2025-05-08

## 2025-05-29 NOTE — PATIENT INSTRUCTIONS
Welcome to [facility].  Dr. Hendrickson sees patients at the following sites:  [place] [address] [facility] at [facility] [address], [address] at [facility] at [facility], [facility] [place] [address]    Dr. Hendrickson's hepatology care coordinator, Viridiana REECE, can be reached at [phone].  Dr. Hendrickson's , Jason Savage, can be reached at [phone].    Dr. Collins would like you to schedule an appointment with him. Please schedule on your way out today for the first available.     Get blood work now.     Get a Liver MRI/MRCP in August. Call [phone] to schedule. Have completed at [facility].  You will need pre-medication for your MRI, due to your history of contrast allergy. Call the office about a month before your MRI to get your medication ordered.     Follow up with me in clinic in September.   Schedule your visit on your way out today. If unable, please call [phone] to schedule.

## 2025-05-29 NOTE — TELEPHONE ENCOUNTER
"Hepatology Nurse Coordinator Note  Patient was seen in clinic. After clinic patient stated he did not want to proceed with the MRI/MRCP if with contrast. He states his reaction was \"diarrhea and vomiting all over that wouldn't stop.\" He would like to discuss further with Dr. Hendrickson. He's aware I will review with Dr. Hendrickson. Patient verbalized understanding.   "

## 2025-05-29 NOTE — PROGRESS NOTES
Subjective     Follow up visit - elevated alk phos, chronic cholestatic liver disease.    History of Present Illness:   Arturo Craft is a 55 y.o. male who presents to the [facility] for follow up of cholestatic liver disease and cirrhosis.    Here for follow up management.  Had a liver biopsy last year.    He has a long-standing Crohn's colitis and perianal Crohn's disease.  He was actually, Initially diagnosed as having ulcerative colitis. However, he later developed perianal disease and his diagnosis was changed to Crohn's disease.   He was treated with 5-ASA and steroids on and off for years. He started on Humira in 3/2019; tolerated Humira well. However he developed a treatment-related palmar-plantar psoriasis and Humira was discontinued. Sept. 2019 he was started on Stelara.     Timeline of some IBD related complications and medication changes:  -11/2019 had new anterior perianal abscess; had EUA and abscess drained, but no new fistula.   -(+) HS with drainage intermittently in underarms  - March to June 2020 Stelera q8 week dosing changed to q6 week dosing.  - perianal pain and new fistula site.   - 12/2020 continued drainage   - 1/2021 colonoscopy showed quiescent pancolonic disease with small pseudopolyps.   - 6/2021 Stellara frequency was increased to Q6 to Q4 for worsening/persistent perianal disease ; since dose increased, has overall been doing well.     Alk phos first elevated in Dec 2020: 191 U/L  Persistently elevated in 2021: Alk phos 256 (8/21) --> 214 ( Sept 2021).  Elevated further  374 U/L  (March 2023). AST and ALT also elevated.   US of liver with Doppler study unremarkable.      At first, concern that the ALK elevation may temporally correlate with change to every 4 week Stelara (which occurred in mid-2021).     Differential diagnosis also included PSC, given his longstanding history of IBD.    Labwork/serologies : AMA neg, LK microsomal neg, Soluble liver ag: neg, ANCA neg  IGG  elevated at 1820.    MRI MRCP with elastography completed on 8/22/23. Prefers not to go downtown for another elastography.     Had a liver biopsy in February 2024.    5/29/2025 clinic visit:  Mr. Craft states that since his last visit, he has noticed feeling weak and having no energy to even barely take care of his mother.  He reports weight loss, approximately 10 pounds since he was seen in clinic.  He denies any decreased appetite or any fever, chills, and night sweats.  Additionally denies any evidence of decompensation, no jaundice, scleral icterus, acholic stools and dark urine.    His pruritus has resolved since taking cholestyramine, however reports recently stopping the medication 1 month ago.  He is also concerned about worsening of the rash on his body, which he believes is related to his biologic Stelara.  He recalls having exact presentation of symptoms with Humira prior to being discontinued by Dr. Collins, who manages his Crohn's disease.        Past Medical History   has a past medical history of Asthma, Chronic iridocyclitis, unspecified eye (10/30/2013), Crohn's disease of large intestine with fistula (Multi) (07/07/2022), Enterocolitis due to Clostridium difficile, not specified as recurrent (03/05/2019), Other conditions influencing health status (10/30/2013), and Personal history of other diseases of the respiratory system.     Social History   reports that he has been smoking cigarettes and cigars. He has never used smokeless tobacco. He reports current drug use. Drug: Marijuana. He reports that he does not drink alcohol.     Family History  family history is not on file.     Allergies  Allergies   Allergen Reactions    Budesonide Anaphylaxis     fever    Adalimumab Unknown    Aspirin Unknown    Entercote Unknown    Iodinated Contrast Media GI Upset    Milk Unknown    Salicylates GI Upset    Salicylic Acid GI Upset    Sulfamethoxazole Unknown    Tomato Other       Medications  Current Outpatient  "Medications   Medication Instructions    albuterol 2.5 mg /3 mL (0.083 %) nebulizer solution 3 mL (2.5 mg).    dorzolamide-timoloL (Cosopt) 22.3-6.8 mg/mL ophthalmic solution USE 1 DROP IN BOTH EYES EVERY 12 HOURS.    Dulera 50-5 mcg/actuation HFA aerosol inhaler inhaler INHALE 2 PUFFS AS INSTRUCTED TWICE DAILY.    fluocinonide 0.05 % cream 1 Application    fluticasone (Flonase) 50 mcg/actuation nasal spray Daily RT    hydrOXYzine HCL (ATARAX) 25 mg, oral, Every 8 hours PRN    lidocaine (Xylocaine) 5 % ointment APPLY TO AFFECTED AREAS AS DIRECTED.    loratadine (CLARITIN) 10 mg, Daily PRN    medical cannabis 1 each    methocarbamol (ROBAXIN) 500 mg, 3 times daily    sildenafil (Viagra) 25 mg tablet TAKE 4 TABLETS BY MOUTH AS NEEDED 30-60 MINUTES BEFORE SEXUAL INTERCOURSE    Spiriva Respimat 2.5 mcg/actuation inhaler 2 puffs, Daily RT    ustekinumab (Stelara) injection Inject 1 syringe (90 mg) under the skin once every 6 weeks    Ventolin HFA 90 mcg/actuation inhaler 2 puffs, Every 4 hours PRN        Objective   Visit Vitals  /69 Comment: right arm   Pulse 51   Temp 36.9 °C (98.4 °F)          3/27/2024     4:18 PM 3/27/2024     5:49 PM 4/4/2024    10:07 AM 6/20/2024     8:43 AM 10/10/2024     9:46 AM 5/29/2025     9:28 AM 5/29/2025     9:38 AM   Vitals   Systolic 140  153 133 148 170 157   Diastolic 64  73 66 72 85 69   Heart Rate 80  66 56 62 59 51   Temp 38.6 °C (101.5 °F) 37.8 °C (100.1 °F) 36.9 °C (98.4 °F) 36.3 °C (97.3 °F) 36.9 °C (98.4 °F) 36.9 °C (98.4 °F)    Resp 18         Height 1.651 m (5' 5\")  1.651 m (5' 5\") 1.651 m (5' 5\") 1.651 m (5' 5\") 1.651 m (5' 5\")    Weight (lb) 174.16  171.7 169.8 170 161    BMI 28.98 kg/m2  28.57 kg/m2 28.26 kg/m2 28.29 kg/m2 26.79 kg/m2    BSA (m2) 1.9 m2  1.89 m2 1.88 m2 1.88 m2 1.83 m2    Visit Report   Report Report Report Report Report     Physical Exam  Vitals reviewed.   Constitutional:       General: He is awake.      Appearance: Normal appearance.   HENT:      " Head: Normocephalic and atraumatic.      Nose: Nose normal.      Mouth/Throat:      Mouth: Mucous membranes are moist.   Eyes:      General: No scleral icterus.     Pupils: Pupils are equal, round, and reactive to light.   Cardiovascular:      Rate and Rhythm: Normal rate and regular rhythm.   Pulmonary:      Effort: Pulmonary effort is normal. No respiratory distress.      Breath sounds: No wheezing.   Abdominal:      General: There is no distension.      Palpations: Abdomen is soft.      Tenderness: There is no abdominal tenderness. There is no guarding.      Comments: Liver edge hard   Musculoskeletal:      Right lower leg: No edema.      Left lower leg: No edema.   Skin:     Coloration: Skin is not jaundiced.      Comments: Rash on upper arms  petechial   Neurological:      Mental Status: He is alert and oriented to person, place, and time. Mental status is at baseline.   Psychiatric:         Attention and Perception: Attention and perception normal.         Mood and Affect: Mood normal.         Behavior: Behavior normal.       Labs    WBC   Date/Time Value Ref Range Status   06/24/2024 12:59 PM 8.2 4.4 - 11.3 x10*3/uL Final     Hemoglobin   Date/Time Value Ref Range Status   06/24/2024 12:59 PM 15.5 13.5 - 17.5 g/dL Final     Hematocrit   Date/Time Value Ref Range Status   06/24/2024 12:59 PM 46.4 41.0 - 52.0 % Final     MCV   Date/Time Value Ref Range Status   06/24/2024 12:59 PM 95 80 - 100 fL Final     Platelets   Date/Time Value Ref Range Status   06/24/2024 12:59  150 - 450 x10*3/uL Final        Total Protein   Date/Time Value Ref Range Status   10/10/2024 10:38 AM 8.4 (H) 6.4 - 8.2 g/dL Final     Albumin   Date/Time Value Ref Range Status   10/10/2024 10:38 AM 4.0 3.4 - 5.0 g/dL Final     AST   Date/Time Value Ref Range Status   10/10/2024 10:38 AM 51 (H) 9 - 39 U/L Final     ALT   Date/Time Value Ref Range Status   10/10/2024 10:38 AM 57 (H) 10 - 52 U/L Final     Comment:     Patients treated with  Sulfasalazine may generate falsely decreased results for ALT.     Alkaline Phosphatase   Date/Time Value Ref Range Status   10/10/2024 10:38  (H) 33 - 120 U/L Final     Bilirubin, Total   Date/Time Value Ref Range Status   10/10/2024 10:38 AM 1.0 0.0 - 1.2 mg/dL Final     Bilirubin, Direct   Date/Time Value Ref Range Status   10/10/2024 10:38 AM 0.3 0.0 - 0.3 mg/dL Final        Vitamin D, 25-Hydroxy   Date/Time Value Ref Range Status   08/31/2021 02:57 PM 49 ng/mL Final     Comment:     .  DEFICIENCY:         < 20   NG/ML  INSUFFICIENCY:      20-29  NG/ML  SUFFICIENCY:         NG/ML    THIS ASSAY ACCURATELY QUANTIFIES THE SUM OF  VITAMIN D3, 25-HYDROXY AND VIT D2,25-HYDROXY.          AST   Date/Time Value Ref Range Status   10/10/2024 10:38 AM 51 (H) 9 - 39 U/L Final     ALT   Date/Time Value Ref Range Status   10/10/2024 10:38 AM 57 (H) 10 - 52 U/L Final     Comment:     Patients treated with Sulfasalazine may generate falsely decreased results for ALT.     Alkaline Phosphatase   Date/Time Value Ref Range Status   10/10/2024 10:38  (H) 33 - 120 U/L Final     Bilirubin, Total   Date/Time Value Ref Range Status   10/10/2024 10:38 AM 1.0 0.0 - 1.2 mg/dL Final     Bilirubin, Direct   Date/Time Value Ref Range Status   10/10/2024 10:38 AM 0.3 0.0 - 0.3 mg/dL Final     Albumin   Date/Time Value Ref Range Status   10/10/2024 10:38 AM 4.0 3.4 - 5.0 g/dL Final     Total Protein   Date/Time Value Ref Range Status   10/10/2024 10:38 AM 8.4 (H) 6.4 - 8.2 g/dL Final        Protime   Date/Time Value Ref Range Status   06/24/2024 12:59 PM 11.5 9.8 - 12.8 seconds Final     INR   Date/Time Value Ref Range Status   06/24/2024 12:59 PM 1.0 0.9 - 1.1 Final       MRCP PANCREAS WO IV CONTRAST; 8/2/2024   IMPRESSION:  1.  No MRI evidence of PSC.  2. Similar pre-existing findings including 5 mm pancreatic cyst.         MRI LIVER W/O-W CONTRAST;  8/22/2023    Impression   1. Cirrhotic morphology of the liver without  evidence of portal  hypertension. MR elastography demonstrates stage 3-4 fibrosis.  2. Hepatic segment VII hemangioma. No suspicious liver lesions.  3. 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       Pathology  Liver biopsy  FINAL DIAGNOSIS   A.  Liver, right, needle core biopsy:  Cirrhosis of liver with minimal activity; etiology not apparent on histology.  See microscopic description.     MICROSCOPIC DESCRIPTION:     The biopsy shows distortion of the hepatic architecture by nodules of regenerating hepatic parenchyma surrounded by fibrosis, consistent with cirrhosis, confirmed by the trichrome and reticulin stains.  The fibrous septa show minimal chronic inflammation composed of small mature appearing lymphocytes and rare plasma cells.  Interface activity is minimal.  There is focal perivenular inflammation. The native interlobular bile ducts appear intact without evidence of injury.  Granulomas or cholestasis is not seen. Steatosis is not identified.  The iron stain shows trace storage iron.  PAS stain after diastase digestion does not highlight diagnostic cytoplasmic inclusions.     Cirrhosis represents the final sequelae of multiple hepatic insults. The differential diagnosis for the etiology is broad and includes hepatotrophic viral infections, drug/toxin mediated liver damage, common causes of steatohepatitis as well as autoimmune hepatitis. Note is made of the elevated serum IgG and alkaline phosphatase levels. Features of a primary biliary disorder are not identified in this specimen. End-stage of partially treated autoimmune hepatitis or drug/medication-related injury remains a possibility. Correlation of clinical and serologic findings will be helpful to determine the etiology for cirrhosis.     Assessment/Plan   Arturo Craft is a 55 y.o. male who presents to Liver clinic for for follow up  for  chronic cholestatic liver disease/persistently abnormal ALK and cirrhosis.    Impression:  PSC remains a possibility  given longstanding Crohn's colitis despite non diagnostic MRI and Liver biopsy. I do note the elevated IGG and IGG4 levels. About 10% of PSC patients will have elevated IGG4. This could be a clue to IGG4 cholangiopathy as well. Otherwise, drug induced liver injury from Stelera (especially as dosing interval has shortened) is possible. Less likely AIH, PBC, sarcoidosis. As of now, cirrhosis is cryptogenic.     Impression: 5/29/2025  Repeat MRCP without IV con from 8/2/2024 without evidence of PSC, bile duct are without evidence of strictures or obstruction.  Liver with similar cirrhotic morphology, and segment 7 noted for benign hemangioma.  FibroScan in clinic today consistent with stage 4 fibrosis (44.1 kPa,).  Diagnosis remains elusive, small-duct PSC remains on the differential given IBD history, and IgG4-SC given elevated levels of IgG4.  Given unequivocal cirrhosis diagnosis without clear etiology, and longstanding history of Crohn's disease his risk remains high for colon cancer therefore plan to discuss repeat endoscopic evaluation this year with Dr. Collins.  Additionally we will recommend yearly MRI liver for disease surveillance, and HCC screening.    Regarding patient's worsening rash now resolved pruritus would recommend discussing management of Crohn's disease with Dr. Collins at his next visit.    Plan:  -MELD labs  -HCC screening: MRI liver with and without IV con and AFP  -Repeat colonoscopy and EGD given elevated risk of CRC and evaluation of varices given advanced cirrhosis and elevated portal HTN.  -RTC in 6 months    Case was discussed and seen with attending Dr. Hendrickson, to addend as necessary    Starr Hunter MD MPH  IM PGY-2 Resident

## 2025-06-04 LAB
AFP-TM SERPL-MCNC: 3.7 NG/ML
ALBUMIN SERPL-MCNC: 4.1 G/DL (ref 3.6–5.1)
ALBUMIN/GLOB SERPL: 1.2 (CALC) (ref 1–2.5)
ALP SERPL-CCNC: 207 U/L (ref 35–144)
ALT SERPL-CCNC: 84 U/L (ref 9–46)
ANION GAP SERPL CALCULATED.4IONS-SCNC: 9 MMOL/L (CALC) (ref 7–17)
AST SERPL-CCNC: 73 U/L (ref 10–35)
BASOPHILS # BLD AUTO: 41 CELLS/UL (ref 0–200)
BASOPHILS NFR BLD AUTO: 0.6 %
BILIRUB DIRECT SERPL-MCNC: 0.1 MG/DL
BILIRUB INDIRECT SERPL-MCNC: 0.3 MG/DL (CALC) (ref 0.2–1.2)
BILIRUB SERPL-MCNC: 0.4 MG/DL (ref 0.2–1.2)
BUN SERPL-MCNC: 10 MG/DL (ref 7–25)
BUN/CREAT SERPL: NORMAL (CALC) (ref 6–22)
CALCIUM SERPL-MCNC: 9.3 MG/DL (ref 8.6–10.3)
CANCER AG19-9 SERPL-ACNC: 56 U/ML
CHLORIDE SERPL-SCNC: 104 MMOL/L (ref 98–110)
CO2 SERPL-SCNC: 26 MMOL/L (ref 20–32)
CREAT SERPL-MCNC: 1.19 MG/DL (ref 0.7–1.3)
EGFRCR SERPLBLD CKD-EPI 2021: 72 ML/MIN/1.73M2
EOSINOPHIL # BLD AUTO: 88 CELLS/UL (ref 15–500)
EOSINOPHIL NFR BLD AUTO: 1.3 %
ERYTHROCYTE [DISTWIDTH] IN BLOOD BY AUTOMATED COUNT: 13.4 % (ref 11–15)
GLOBULIN SER CALC-MCNC: 3.5 G/DL (CALC) (ref 1.9–3.7)
GLUCOSE SERPL-MCNC: 88 MG/DL (ref 65–99)
HCT VFR BLD AUTO: 45.2 % (ref 38.5–50)
HGB BLD-MCNC: 15 G/DL (ref 13.2–17.1)
IGG SERPL-MCNC: 1684 MG/DL (ref 600–1640)
IGG1 SER-MCNC: 892 MG/DL (ref 382–929)
IGG2 SER-MCNC: 625 MG/DL (ref 241–700)
IGG3 SER-MCNC: 73 MG/DL (ref 22–178)
IGG4 SER-MCNC: 121.4 MG/DL (ref 4–86)
INR PPP: 1
LYMPHOCYTES # BLD AUTO: 2013 CELLS/UL (ref 850–3900)
LYMPHOCYTES NFR BLD AUTO: 29.6 %
MCH RBC QN AUTO: 30.7 PG (ref 27–33)
MCHC RBC AUTO-ENTMCNC: 33.2 G/DL (ref 32–36)
MCV RBC AUTO: 92.6 FL (ref 80–100)
MONOCYTES # BLD AUTO: 510 CELLS/UL (ref 200–950)
MONOCYTES NFR BLD AUTO: 7.5 %
NEUTROPHILS # BLD AUTO: 4148 CELLS/UL (ref 1500–7800)
NEUTROPHILS NFR BLD AUTO: 61 %
PLATELET # BLD AUTO: 178 THOUSAND/UL (ref 140–400)
PMV BLD REES-ECKER: 10 FL (ref 7.5–12.5)
POTASSIUM SERPL-SCNC: 4.3 MMOL/L (ref 3.5–5.3)
PROT SERPL-MCNC: 7.6 G/DL (ref 6.1–8.1)
PROTHROMBIN TIME: 10.6 SEC (ref 9–11.5)
RBC # BLD AUTO: 4.88 MILLION/UL (ref 4.2–5.8)
SODIUM SERPL-SCNC: 139 MMOL/L (ref 135–146)
WBC # BLD AUTO: 6.8 THOUSAND/UL (ref 3.8–10.8)

## 2025-07-03 NOTE — TELEPHONE ENCOUNTER
Hepatology Nurse Coordinator Note  Per Dr. Hendrickson, will cancel MRI and order Liver Ultrasound. This will be due same time as MRI. Called patient to review with updated recommendations. Patient did not answer. Unable to leave voicemail. Voicemail box is full.

## 2025-07-07 DIAGNOSIS — L29.81 CHOLESTATIC PRURITUS: ICD-10-CM

## 2025-07-09 RX ORDER — HYDROXYZINE HYDROCHLORIDE 25 MG/1
25 TABLET, FILM COATED ORAL EVERY 8 HOURS PRN
Qty: 60 TABLET | Refills: 1 | Status: SHIPPED | OUTPATIENT
Start: 2025-07-09 | End: 2025-09-07

## 2025-07-11 ENCOUNTER — SPECIALTY PHARMACY (OUTPATIENT)
Dept: PHARMACY | Facility: CLINIC | Age: 55
End: 2025-07-11

## 2025-07-11 PROCEDURE — RXMED WILLOW AMBULATORY MEDICATION CHARGE

## 2025-07-14 ENCOUNTER — SPECIALTY PHARMACY (OUTPATIENT)
Dept: PHARMACY | Facility: CLINIC | Age: 55
End: 2025-07-14

## 2025-07-15 ENCOUNTER — PHARMACY VISIT (OUTPATIENT)
Dept: PHARMACY | Facility: CLINIC | Age: 55
End: 2025-07-15
Payer: MEDICAID

## 2025-07-16 NOTE — PROGRESS NOTES
REASON FOR VISIT:  Crohn's disease    HPI:  Arturo Craft is a 55 y.o. male who presents for follow-up of Crohn's colitis and perianal Crohn's disease. Initially diagnosed as UC in 1990's.  Later developed perianal disease -->Crohn's disease.  Treated with 5-ASA and steroids on and off for years.  (+) HS with drainage intermittently in underarms     Also (+) mild persistent LFT abnormalities (elevated alk phos since 2020; USG of liver with Doppler unrevealing.  ?correlate with change to every 4 week Stelara? Saw Dr. Hendrickson.  MRI w/ elastography suggests cirrhosis w/o portal HTN.  2/2024 liver biopsy shows cirrhosis of liver with minimal activity; etiology not apparent on histology. Unclear if  this is from PSC vs. Drug (Stelara) vs other.  Following with Dr. Hendrickson.     Started Humira in 3/2019; did OK but developed a treatment-related palmar-plantar psoriasis. 9/2019 changed to Stelara. 3/2020 FCP 34 (normal) and Stelara level 2.7 on every 8 week dosing; changed Q6 week dosing 6/2020.  12/2020 f/u Anser UST  on Stelara every 6 weeks = 4.8 with no antibodies.   FCP 52  CRP 1.42     6/2021 Stellara frequency from Q6 to Q4 for worsening/persistent perianal disease; since dose increased to every 4 weeks, has overall been doing well.     6/2023 colonoscopy OK; path all without any active or chronic inflammation of dysplsia.  Repeat in 3 years.       Last seen 10/2024 and doing OK.  Dr. Hendrickson gave him cholestyramine for itching.  He did see Dermatology.  Skin biopsy with telangiectasia with perivascular lymphocytic infiltrate.  He did not tolerate cholestyramine.  Felt bloated and gassy.  Bowels were doing well. Stools formed 3-4 daily.  No blood.  No fistula activity on every 4 week Stelara.  Tolerating Stelara OK.  Has not missed any doses.  Day he gives the shot feels fatigued, then feels better after 2-3 days.  Felt tired a lot.  Continues to smoke.  No odynoaphasia or dysphagia.  No IBD extraintestinal  manifestations.  CABRERA (-).  Histone Ab 0.5.  Anser UST 9.9 without antibodies-->Stelar moved back to Q 6 weeks.    In follow-up today, weight is down about 10 lbs over 10 months.  Overall, feels OK, but fatigued still.  Breathing OK.  Bms about 3 daily.  No perianal issues.  Stelara every 6 weeks; tolerates OK.  Aside from fatigue, Crohn's no different.  No loose stool.  No blood in stool.  Skin doing OK. No further pruritus.      REVIEW OF SYSTEMS  Complete review of systems otherwise negative per complaint    Allergies   Allergen Reactions    Budesonide Anaphylaxis     fever    Adalimumab Unknown    Aspirin Unknown    Entercote Unknown    Iodinated Contrast Media GI Upset    Milk Unknown    Salicylates GI Upset    Salicylic Acid GI Upset    Sulfamethoxazole Unknown    Tomato Other       Past Medical History:   Diagnosis Date    Asthma     Chronic iridocyclitis, unspecified eye 10/30/2013    Chronic iritis    Crohn's disease of large intestine with fistula (Multi) 07/07/2022    Crohn's disease of large intestine with fistula    Enterocolitis due to Clostridium difficile, not specified as recurrent 03/05/2019    C. difficile colitis    Other conditions influencing health status 10/30/2013    Endothelial Corneal Dystrophy    Personal history of other diseases of the respiratory system     Personal history of asthma       Past Surgical History:   Procedure Laterality Date    OTHER SURGICAL HISTORY  10/08/2021    Anal seton placement    OTHER SURGICAL HISTORY  08/26/2014    Brain Surgery       Current Outpatient Medications   Medication Sig Dispense Refill    albuterol 2.5 mg /3 mL (0.083 %) nebulizer solution 3 mL (2.5 mg).      dorzolamide-timoloL (Cosopt) 22.3-6.8 mg/mL ophthalmic solution USE 1 DROP IN BOTH EYES EVERY 12 HOURS.      Dulera 50-5 mcg/actuation HFA aerosol inhaler inhaler INHALE 2 PUFFS AS INSTRUCTED TWICE DAILY.      fluocinonide 0.05 % cream 1 Application      fluticasone (Flonase) 50 mcg/actuation  "nasal spray Administer into affected nostril(s) once daily.      hydrOXYzine HCL (Atarax) 25 mg tablet Take 1 tablet (25 mg) by mouth every 8 hours if needed for itching. 60 tablet 1    lidocaine (Xylocaine) 5 % ointment APPLY TO AFFECTED AREAS AS DIRECTED. (Patient not taking: Reported on 5/29/2025)      loratadine (Claritin) 10 mg tablet Take 1 tablet (10 mg) by mouth once daily as needed.      medical cannabis Take 1 each by mouth.      methocarbamol (Robaxin) 500 mg tablet Take 1 tablet (500 mg) by mouth 3 times a day.      sildenafil (Viagra) 25 mg tablet TAKE 4 TABLETS BY MOUTH AS NEEDED 30-60 MINUTES BEFORE SEXUAL INTERCOURSE      Spiriva Respimat 2.5 mcg/actuation inhaler Inhale 2 puffs once daily.      ustekinumab (Stelara) injection Inject 1 syringe (90 mg) under the skin once every 6 weeks 1 mL 7    Ventolin HFA 90 mcg/actuation inhaler Inhale 2 puffs every 4 hours if needed.       No current facility-administered medications for this visit.       PHYSICAL EXAM:  /77   Pulse 56   Temp 36.8 °C (98.2 °F) (Temporal)   Ht 1.6 m (5' 3\")   Wt 72.6 kg (160 lb)   BMI 28.34 kg/m²   Vital signs reviewed  Patient alert and oriented in no acute distress  Anicteric  No cervical adenopathy  Cardiac exam regular rate and rhythm S1-S2 without murmurs gallops or rubs  Lungs clear to auscultation bilaterally  Abdomen soft and nontender without organomegaly or mass.  No rebound or guarding.  Bowel sounds present  Extremities without edema        Lab Results   Component Value Date    WBC 6.8 05/30/2025    HGB 15.0 05/30/2025    HCT 45.2 05/30/2025    MCV 92.6 05/30/2025     05/30/2025     Lab Results   Component Value Date    ALT 84 (H) 05/30/2025    AST 73 (H) 05/30/2025    ALKPHOS 207 (H) 05/30/2025    BILITOT 0.4 05/30/2025       === 8/2/2024 ===  MRCP    IMPRESSION:  1.  No MRI evidence of PSC.  2. Similar pre-existing findings including 5 mm pancreatic cyst.        ASSESSMENT  #Crohn's disease-Crohn's " disease clinically stable with good control of disease related symptoms.  No active perianal disease.  Given that he may have intrahepatic PSC, we will schedule for routine surveillance colonoscopy.    #cirrhosis-etiology of cirrhosis is uncertain.  He follows with Dr. Hendrickson.  Will plan for upper endoscopy at the same time that we do the colonoscopy to evaluate for esophageal varices.  Currently cirrhosis is stable without signs or symptoms of decompensated liver disease     #pancreatic cyst-Dr. Hendrickson has ordered follow-up MRI to evaluate liver and this should evaluate pancreatic cyst as well    PLAN  1) continue Stelara every 6 weeks  2) schedule upper endoscopy and colonoscopy  3) follow-up in the office in 6 months  4) check TB spot

## 2025-07-17 ENCOUNTER — OFFICE VISIT (OUTPATIENT)
Dept: GASTROENTEROLOGY | Facility: CLINIC | Age: 55
End: 2025-07-17
Payer: COMMERCIAL

## 2025-07-17 VITALS
WEIGHT: 160 LBS | TEMPERATURE: 98.2 F | SYSTOLIC BLOOD PRESSURE: 149 MMHG | HEIGHT: 63 IN | BODY MASS INDEX: 28.35 KG/M2 | HEART RATE: 56 BPM | DIASTOLIC BLOOD PRESSURE: 77 MMHG

## 2025-07-17 DIAGNOSIS — K50.113 CROHN'S DISEASE OF LARGE INTESTINE WITH FISTULA (MULTI): Primary | ICD-10-CM

## 2025-07-17 DIAGNOSIS — K74.60 CIRRHOSIS OF LIVER WITHOUT ASCITES, UNSPECIFIED HEPATIC CIRRHOSIS TYPE (MULTI): ICD-10-CM

## 2025-07-17 PROCEDURE — 3008F BODY MASS INDEX DOCD: CPT | Performed by: INTERNAL MEDICINE

## 2025-07-17 PROCEDURE — 99214 OFFICE O/P EST MOD 30 MIN: CPT | Performed by: INTERNAL MEDICINE

## 2025-07-17 PROCEDURE — 99212 OFFICE O/P EST SF 10 MIN: CPT | Performed by: INTERNAL MEDICINE

## 2025-07-17 RX ORDER — GABAPENTIN 300 MG/1
300 CAPSULE ORAL 3 TIMES DAILY
COMMUNITY

## 2025-07-17 ASSESSMENT — PAIN SCALES - GENERAL: PAINLEVEL_OUTOF10: 10-WORST PAIN EVER

## 2025-07-17 NOTE — PATIENT INSTRUCTIONS
Thank you for coming in for follow-up today.  It is great that you are Crohn's disease symptoms remain stable on Stelara every 6 weeks.  Will plan to repeat a colonoscopy to assess your disease activity and make sure that there is no precancerous changes or polyps developing.  As we discussed today:    PLAN  1) continue Stelara every 6 weeks  2) schedule upper endoscopy and colonoscopy  3) follow-up in the office in 6 months  4) check TB blood test

## 2025-08-01 ENCOUNTER — HOSPITAL ENCOUNTER (OUTPATIENT)
Dept: RADIOLOGY | Facility: CLINIC | Age: 55
Discharge: HOME | End: 2025-08-01
Payer: COMMERCIAL

## 2025-08-01 ENCOUNTER — APPOINTMENT (OUTPATIENT)
Dept: RADIOLOGY | Facility: HOSPITAL | Age: 55
End: 2025-08-01
Payer: COMMERCIAL

## 2025-08-01 DIAGNOSIS — K74.00 LIVER FIBROSIS: ICD-10-CM

## 2025-08-01 DIAGNOSIS — K74.60 CIRRHOSIS OF LIVER WITHOUT ASCITES, UNSPECIFIED HEPATIC CIRRHOSIS TYPE (MULTI): ICD-10-CM

## 2025-08-01 DIAGNOSIS — K83.1 CHOLESTATIC LIVER DISEASE: ICD-10-CM

## 2025-08-01 PROCEDURE — 76705 ECHO EXAM OF ABDOMEN: CPT

## 2025-08-15 ENCOUNTER — SPECIALTY PHARMACY (OUTPATIENT)
Dept: PHARMACY | Facility: CLINIC | Age: 55
End: 2025-08-15

## 2025-08-15 PROCEDURE — RXMED WILLOW AMBULATORY MEDICATION CHARGE

## 2025-08-18 ENCOUNTER — PHARMACY VISIT (OUTPATIENT)
Dept: PHARMACY | Facility: CLINIC | Age: 55
End: 2025-08-18
Payer: MEDICAID

## 2025-08-21 ENCOUNTER — APPOINTMENT (OUTPATIENT)
Dept: GASTROENTEROLOGY | Facility: CLINIC | Age: 55
End: 2025-08-21
Payer: COMMERCIAL

## 2025-09-04 ENCOUNTER — OFFICE VISIT (OUTPATIENT)
Dept: GASTROENTEROLOGY | Facility: CLINIC | Age: 55
End: 2025-09-04
Payer: COMMERCIAL

## 2025-09-04 VITALS
WEIGHT: 158 LBS | HEIGHT: 65 IN | TEMPERATURE: 98.4 F | DIASTOLIC BLOOD PRESSURE: 73 MMHG | SYSTOLIC BLOOD PRESSURE: 158 MMHG | BODY MASS INDEX: 26.33 KG/M2 | HEART RATE: 65 BPM

## 2025-09-04 DIAGNOSIS — K83.1 CHOLESTATIC LIVER DISEASE: ICD-10-CM

## 2025-09-04 DIAGNOSIS — L29.81 CHOLESTATIC PRURITUS: ICD-10-CM

## 2025-09-04 DIAGNOSIS — R74.8 ELEVATED ALKALINE PHOSPHATASE LEVEL: ICD-10-CM

## 2025-09-04 DIAGNOSIS — K50.113 CROHN'S DISEASE OF LARGE INTESTINE WITH FISTULA (MULTI): ICD-10-CM

## 2025-09-04 PROCEDURE — 99212 OFFICE O/P EST SF 10 MIN: CPT

## 2025-09-04 ASSESSMENT — PAIN SCALES - GENERAL: PAINLEVEL_OUTOF10: 8
